# Patient Record
Sex: FEMALE | Race: OTHER | HISPANIC OR LATINO | Employment: FULL TIME | URBAN - METROPOLITAN AREA
[De-identification: names, ages, dates, MRNs, and addresses within clinical notes are randomized per-mention and may not be internally consistent; named-entity substitution may affect disease eponyms.]

---

## 2022-05-03 ENCOUNTER — OFFICE VISIT (OUTPATIENT)
Dept: URGENT CARE | Facility: CLINIC | Age: 27
End: 2022-05-03
Payer: COMMERCIAL

## 2022-05-03 VITALS
RESPIRATION RATE: 16 BRPM | HEIGHT: 60 IN | WEIGHT: 168 LBS | BODY MASS INDEX: 32.98 KG/M2 | DIASTOLIC BLOOD PRESSURE: 79 MMHG | HEART RATE: 94 BPM | OXYGEN SATURATION: 98 % | SYSTOLIC BLOOD PRESSURE: 117 MMHG | TEMPERATURE: 99 F

## 2022-05-03 DIAGNOSIS — S00.83XA FACIAL CONTUSION, INITIAL ENCOUNTER: Primary | ICD-10-CM

## 2022-05-03 PROCEDURE — 70150 X-RAY EXAM OF FACIAL BONES: CPT | Mod: S$GLB,,, | Performed by: RADIOLOGY

## 2022-05-03 PROCEDURE — 99203 OFFICE O/P NEW LOW 30 MIN: CPT | Mod: S$GLB,,, | Performed by: NURSE PRACTITIONER

## 2022-05-03 PROCEDURE — 99203 PR OFFICE/OUTPT VISIT, NEW, LEVL III, 30-44 MIN: ICD-10-PCS | Mod: S$GLB,,, | Performed by: NURSE PRACTITIONER

## 2022-05-03 PROCEDURE — 70150 XR FACIAL BONES 3 OR MORE VIEW: ICD-10-PCS | Mod: S$GLB,,, | Performed by: RADIOLOGY

## 2022-05-03 RX ORDER — TRETINOIN 0.5 MG/G
CREAM TOPICAL NIGHTLY
COMMUNITY
Start: 2022-04-27

## 2022-05-03 RX ORDER — DOXYCYCLINE 50 MG/1
50 CAPSULE ORAL 2 TIMES DAILY
COMMUNITY
Start: 2022-04-29 | End: 2023-06-06

## 2022-05-03 RX ORDER — PHENTERMINE HYDROCHLORIDE 37.5 MG/1
18.75 TABLET ORAL 2 TIMES DAILY
COMMUNITY
Start: 2022-04-15 | End: 2023-06-06

## 2022-05-03 NOTE — PROGRESS NOTES
Subjective:       Patient ID: Velvet Kramer is a 27 y.o. female.    Chief Complaint: Eye Problem    Patient stated the incident happened about 3 hours ago.  The patient was sitting down and she went to grab his iPad for him and she bent down and hit her right eye on the rail.  She stated it was red and she feels dizzy.  She has a headache but her eye does not itch.   Patient's place of employment - [employer]  Patient's job title - Care giver  Date of injury - 05/03/2022  Body part injured including left or right - right eye   Injury Mechanism -   What they were doing when they got hurt - bending down trying to get the patients iPad and she did not see the rail going upstairs.  What they did immediately after - called the office because her eye was getting red and it hurts really bad.    27-year-old female presents to clinic for evaluation of injury to right eye, sustained while at work today approximately 3 hours prior to arrival to Urgent Care.  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  She states initially she felt dizzy.  She has applied ice to the affected area.  She reports redness and swelling to right upper cheek.  She has not taken any medications for her current symptoms.  She denies drainage from eye.  She denies LOC.  She reports visual concerns due to headache.  She is awake and alert, answers questions appropriately, no acute distress noted on today's visit.    Eye Problem   The right eye is affected. This is a new problem. The current episode started today. The problem occurs constantly. The problem has been unchanged. The injury mechanism is unknown. The pain is at a severity of 8/10. The pain is severe. There is no known exposure to pink eye. She does not wear contacts. Associated symptoms include a foreign body sensation. Pertinent negatives include no  blurred vision, eye discharge, eye redness, fever, itching, nausea or vomiting. Associated symptoms comments: Headache, dizzy. Treatments tried: ice. The treatment provided mild relief.       Constitution: Negative for activity change, appetite change, chills, sweating, fatigue and fever.   HENT: Negative for congestion, nosebleeds and sinus pain.    Cardiovascular: Negative for chest pain.   Eyes: Positive for eye pain. Negative for foreign body in eye, eye discharge, eye itching, eye redness and blurred vision.   Respiratory: Negative for shortness of breath.    Gastrointestinal: Negative for nausea and vomiting.   Musculoskeletal: Positive for pain and trauma.   Skin: Positive for erythema and bruising.   Neurological: Positive for dizziness and headaches. Negative for light-headedness, passing out, coordination disturbances, loss of balance, disorientation, altered mental status, loss of consciousness, numbness and tingling.   Psychiatric/Behavioral: Negative for altered mental status and disorientation.        Objective:      Physical Exam  Vitals and nursing note reviewed.   Constitutional:       General: She is not in acute distress.     Appearance: Normal appearance. She is not ill-appearing, toxic-appearing or diaphoretic.   HENT:      Head: Normocephalic and atraumatic.        Right Ear: External ear normal.      Left Ear: External ear normal.      Nose: Nose normal.      Mouth/Throat:      Mouth: Mucous membranes are moist.   Eyes:      General:         Right eye: No discharge.         Left eye: No discharge.      Extraocular Movements: Extraocular movements intact.      Right eye: Normal extraocular motion and no nystagmus.      Left eye: Normal extraocular motion and no nystagmus.      Conjunctiva/sclera: Conjunctivae normal.      Right eye: Right conjunctiva is not injected. No exudate or hemorrhage.     Left eye: Left conjunctiva is not injected. No exudate or hemorrhage.     Pupils: Pupils are equal,  round, and reactive to light.   Cardiovascular:      Rate and Rhythm: Normal rate.      Pulses: Normal pulses.   Pulmonary:      Effort: Pulmonary effort is normal. No respiratory distress.   Musculoskeletal:         General: Swelling and tenderness present. No deformity or signs of injury.      Cervical back: Normal range of motion.   Skin:     Coloration: Skin is not jaundiced or pale.      Findings: Bruising and erythema present.   Neurological:      General: No focal deficit present.      Mental Status: She is alert and oriented to person, place, and time.      Motor: No weakness.   Psychiatric:         Mood and Affect: Mood normal.         Behavior: Behavior normal.         Thought Content: Thought content normal.         Judgment: Judgment normal.         XR FACIAL BONES 3 OR MORE VIEW    Result Date: 5/3/2022  EXAMINATION: XR FACIAL BONES 3 OR MORE VIEW CLINICAL HISTORY: Unspecified injury of right eye and orbit, initial encounter TECHNIQUE: Four views of the facial bones were performed. COMPARISON: None FINDINGS: There is no evidence of acute fracture or dislocation of the maxillofacial structures.  The bilateral nasal bones appear intact.  The nasal septum appears midline.  The bilateral orbits appear intact.  The mandible is intact and not dislocated.  No large depressed calvarial fracture.     No acute maxillofacial fracture or dislocation. Electronically signed by: Ant Soto Date:    05/03/2022 Time:    18:45      Assessment:       1. Facial contusion, initial encounter        Plan:       Discussed x-rays, no acute fracture of the orbit.  Suspect contusion.  Discuss for stayed measures, with ice, and alternating Tylenol/Motrin for pain relief.  Referral placed to occupational health, discussed walk-in at either [facility] or [facility] tomorrow morning.  Patient verbalized understanding and is in agreement with plan.      Patient Instructions   - You must understand that you have received an Urgent  Care treatment only and that you may be released before all of your medical problems are known or treated.   - You, the patient, will arrange for follow up care as instructed.   - If your condition worsens or fails to improve we recommend that you receive another evaluation at the ER immediately or contact your PCP to discuss your concerns or return here.                     No follow-ups on file.

## 2022-05-03 NOTE — LETTER
[facility] - [facility]  [address], [place] [address]  Phone: [phone]  Fax: [phone]  [facility]: [id number]    Pt Name: Velvet Kramer  Injury Date: 05/03/2022   Employee ID:  Date of First Treatment: 05/03/2022   Company: [employer]       Appointment Time: 04:35 PM Arrived:1707   Provider: Buzz Zepeda NP Time Out:4750     Office Treatment:   1. Facial contusion, initial encounter                     Return Appointment: 5/4/2022 / [facility], walk in @ either [facility] or [place] location.

## 2022-05-04 ENCOUNTER — OFFICE VISIT (OUTPATIENT)
Dept: URGENT CARE | Facility: CLINIC | Age: 27
End: 2022-05-04
Payer: COMMERCIAL

## 2022-05-04 VITALS
BODY MASS INDEX: 32.98 KG/M2 | DIASTOLIC BLOOD PRESSURE: 69 MMHG | TEMPERATURE: 99 F | HEIGHT: 60 IN | HEART RATE: 100 BPM | OXYGEN SATURATION: 98 % | SYSTOLIC BLOOD PRESSURE: 109 MMHG | WEIGHT: 168 LBS

## 2022-05-04 DIAGNOSIS — S00.83XA FACIAL CONTUSION, INITIAL ENCOUNTER: ICD-10-CM

## 2022-05-04 DIAGNOSIS — Z02.6 ENCOUNTER RELATED TO WORKER'S COMPENSATION CLAIM: Primary | ICD-10-CM

## 2022-05-04 DIAGNOSIS — S05.91XA RIGHT ORBIT TRAUMA, INITIAL ENCOUNTER: ICD-10-CM

## 2022-05-04 DIAGNOSIS — G44.319 ACUTE POST-TRAUMATIC HEADACHE, NOT INTRACTABLE: ICD-10-CM

## 2022-05-04 PROCEDURE — 99214 OFFICE O/P EST MOD 30 MIN: CPT | Mod: S$GLB,,, | Performed by: PREVENTIVE MEDICINE

## 2022-05-04 PROCEDURE — 99214 PR OFFICE/OUTPT VISIT, EST, LEVL IV, 30-39 MIN: ICD-10-PCS | Mod: S$GLB,,, | Performed by: PREVENTIVE MEDICINE

## 2022-05-04 RX ORDER — IBUPROFEN 600 MG/1
600 TABLET ORAL EVERY 6 HOURS PRN
Qty: 40 TABLET | Refills: 0 | Status: SHIPPED | OUTPATIENT
Start: 2022-05-04 | End: 2023-06-06

## 2022-05-04 NOTE — LETTER
[facility]  [address] [address]  Phone: [phone]  Fax: [phone]  [facility]: [id number]    Pt Name: Velvet Kramer  Injury Date: 05/03/2022   Employee ID:  Date of First Treatment: 05/04/2022   Company: [employer]      Appointment Time: 08:15 AM Arrived: 8:15 AM   Provider: Michael Azevedo MD Time Out: 10:20 AM     Office Treatment:   1. Encounter related to worker's compensation claim    2. Facial contusion, initial encounter    3. Right orbit trauma, initial encounter    4. Acute post-traumatic headache, not intractable      Medications Ordered This Encounter   Medications    ibuprofen (ADVIL,MOTRIN) 600 MG tablet      Patient Instructions: Apply ice 24-48 hours then apply heat/warm soaks      Restrictions: Disabled until next office visit     Return Appointment: 5/9/2022 at 2:15 AM GRETCHEN

## 2022-05-04 NOTE — PROGRESS NOTES
Subjective:       Patient ID: Velvet Kramer is a 27 y.o. female.    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 She is unsure if she hit the inside of the eye ball but knows she hit the outer part (socket). No bruising or bleeding was present. She felt light headed and sat down immediately after the incident and complains about having a headache now. She states there is some tingling as well as achiness.  A vision test was administered - she was 20/70 in both eyes, 20/70 individually. Current pain score 8/10. LRC        Constitution: Positive for activity change.   HENT: Negative.    Neck: neck negative.   Cardiovascular: Negative.    Eyes: Positive for eye trauma, eye pain, double vision and blurred vision.   Respiratory: Negative.    Gastrointestinal: Negative.    Musculoskeletal: Positive for pain and muscle ache.   Skin: Negative for color change, erythema and bruising.   Neurological: Positive for dizziness, light-headedness and tingling.        Objective:      Physical Exam  Vitals and nursing note reviewed.   Constitutional:       Appearance: Normal appearance. She is well-developed and normal weight.   HENT:      Head: Normocephalic. Contusion present. No Aragon's sign, abrasion, masses, right periorbital erythema or laceration.      Jaw: There is normal jaw occlusion.      Salivary Glands: Right salivary gland is not diffusely enlarged. Left salivary gland is not diffusely enlarged.        Comments: Patient has tenderness with palpation about the inferior aspect of her right orbit with minimal swelling noted.  minimal ecchymosis is noted at this time.  Patient's funduscopic examination is unremarkable without evidence of corneal abrasion, hyphema, or other trauma to the eye.     Right Ear: Tympanic membrane,  ear canal and external ear normal.      Left Ear: Tympanic membrane, ear canal and external ear normal.      Nose: Nose normal.      Mouth/Throat:      Mouth: Mucous membranes are moist.   Eyes:      General:         Right eye: No discharge.         Left eye: No discharge.      Extraocular Movements: Extraocular movements intact.      Conjunctiva/sclera: Conjunctivae normal.      Pupils: Pupils are equal, round, and reactive to light.   Cardiovascular:      Rate and Rhythm: Normal rate.   Pulmonary:      Effort: Pulmonary effort is normal.   Musculoskeletal:      Cervical back: Normal range of motion.      Lumbar back: No swelling, edema, deformity, lacerations, spasms or bony tenderness.   Skin:     General: Skin is warm.      Findings: No erythema.   Neurological:      Mental Status: She is alert.      Comments: No focal neurologic deficits         Assessment:       1. Encounter related to worker's compensation claim    2. Facial contusion, initial encounter    3. Right orbit trauma, initial encounter    4. Acute post-traumatic headache, not intractable        Plan:         Medications Ordered This Encounter   Medications    ibuprofen (ADVIL,MOTRIN) 600 MG tablet     Sig: Take 1 tablet (600 mg total) by mouth every 6 (six) hours as needed for Pain (Take medication with food).     Dispense:  40 tablet     Refill:  0     Patient Instructions: Apply ice 24-48 hours then apply heat/warm soaks   Restrictions: Disabled until next office visit  Follow up in about 5 days (around 5/9/2022).

## 2022-05-09 ENCOUNTER — OFFICE VISIT (OUTPATIENT)
Dept: URGENT CARE | Facility: CLINIC | Age: 27
End: 2022-05-09
Payer: COMMERCIAL

## 2022-05-09 DIAGNOSIS — S00.83XD FACIAL CONTUSION, SUBSEQUENT ENCOUNTER: Primary | ICD-10-CM

## 2022-05-09 DIAGNOSIS — S05.91XD RIGHT ORBIT TRAUMA, SUBSEQUENT ENCOUNTER: ICD-10-CM

## 2022-05-09 DIAGNOSIS — G44.319 ACUTE POST-TRAUMATIC HEADACHE, NOT INTRACTABLE: ICD-10-CM

## 2022-05-09 PROCEDURE — 99214 PR OFFICE/OUTPT VISIT, EST, LEVL IV, 30-39 MIN: ICD-10-PCS | Mod: S$GLB,,, | Performed by: EMERGENCY MEDICINE

## 2022-05-09 PROCEDURE — 99214 OFFICE O/P EST MOD 30 MIN: CPT | Mod: S$GLB,,, | Performed by: EMERGENCY MEDICINE

## 2022-05-09 RX ORDER — BUTALBITAL, ACETAMINOPHEN AND CAFFEINE 50; 325; 40 MG/1; MG/1; MG/1
1 TABLET ORAL EVERY 6 HOURS PRN
Qty: 28 TABLET | Refills: 0 | Status: SHIPPED | OUTPATIENT
Start: 2022-05-09 | End: 2022-05-16

## 2022-05-09 NOTE — LETTER
[facility]  [address] [address]  Phone: [phone]  Fax: [phone]  [facility]: [id number]    Pt Name: Velvet Kramer  Injury Date: 05/03/2022   Employee ID:  Date of First Treatment: 05/09/2022   Company: [employer]      Appointment Time: 02:15 PM Arrived: 2:44 PM   Provider: Marcos Henson MD Time Out: 4:15 PM     Office Treatment:   1. Facial contusion, subsequent encounter    2. Right orbit trauma, subsequent encounter    3. Acute post-traumatic headache, not intractable      Medications Ordered This Encounter   Medications    butalbital-acetaminophen-caffeine -40 mg (FIORICET, ESGIC) -40 mg per tablet      Patient Instructions: Attention not to aggravate affected area (ICE PACKS TO THE RIGHT FACE/EYE REGION 4 TIMES PER DAY FOR 15-20 MINUTES)      Restrictions: Disabled until next office visit     Return Appointment: 5/16/2022 at 11:15 AM GRETCHEN

## 2022-05-09 NOTE — PROGRESS NOTES
Subjective:       Patient ID: Velvet Kramer is a 27 y.o. female.    Chief Complaint: Eye Pain (RT)    WC Follow-up of RT Eye Injury ( DOI 05-03-22 ) Pain score 7/10 with complaints of Headaches, RT Eye twitching, Spasms, Dizziness. Taking OTC IBP 200mg. 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  Since then she has reported headaches, swelling to the right upper cheek in lower eye.  She also noticed the pigment discoloration to the 4 o'clock position around the iris that she and her boyfriend had not noticed before.  She denies diplopia denies tearing denies pain of the eye itself and has normal extraocular movements.  They are requesting a full ophthalmological evaluation.  Fluorescein exam today shows no fluorescein uptake, no corneal abrasion, no corneal laceration, normal extraocular movements, visual acuity 20 70 in both eyes from last visit and is unchanged.  I reviewed the x-ray showing no acute fracture of the orbit.  Patient reports not being able to work due to facial pain and swelling as well as intermittent headaches and some light sensitivity to the right eye.  Will prescribe Fioricet, refer to ophthalmology for full dilated eye exam including pressures and will have her return in 1 week.  Kept disabled for 1 week.  Courage to take ibuprofen and prescribed Fioricet for facial pain and headache and to wear sunglasses for comfort and to return here in 1 week.    Eye Pain   Pertinent negatives include no double vision or eye redness.     ROS    Eyes: Positive for eye pain. Negative for eye trauma, foreign body in eye, eye redness, double vision and eyelid swelling.   Musculoskeletal: Positive for pain.   Skin: Negative for wound, erythema and bruising.   Neurological: Positive for dizziness and headaches.         Objective:      Physical Exam  Vitals and nursing note reviewed.   Constitutional:       Appearance: She is well-developed.   HENT:      Head: Normocephalic and atraumatic.      Right Ear: External ear normal.      Left Ear: External ear normal.      Nose: Nose normal.   Eyes:      General: Lids are normal.         Right eye: No discharge.         Left eye: No discharge.      Extraocular Movements: Extraocular movements intact.      Conjunctiva/sclera: Conjunctivae normal.      Pupils: Pupils are equal, round, and reactive to light.      Comments: THERE IS SOFT TISSUE SWELLING OF THE RIGHT INFRAORBITAL REGION WITH TENDERNESS TO PALPATION WHICH IS DESCRIBED AS MILD.  EXTRAOCULAR MOVEMENTS ARE INTACT, FLUORESCEIN EXAM SHOWS NO FLUORESCEIN UPTAKE NO HYPHEMA AND CONJUNCTIVA SHOWS NO CONJUNCTIVAL HEMORRHAGE.  THERE IS A 2 MM PIGMENT DISCOLORATION AT THE 4 O'CLOCK POSITION WHICH APPEARS CHRONIC.   Neck:      Trachea: Trachea and phonation normal.   Musculoskeletal:         General: Normal range of motion.      Cervical back: Full passive range of motion without pain and neck supple.   Skin:     General: Skin is warm and dry.      Findings: No erythema.   Neurological:      Mental Status: She is alert and oriented to person, place, and time.   Psychiatric:         Speech: Speech normal.         Behavior: Behavior normal.         Thought Content: Thought content normal.         Judgment: Judgment normal.         Assessment:       1. Facial contusion, subsequent encounter    2. Right orbit trauma, subsequent encounter    3. 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  Since then she has reported headaches, swelling to the right upper cheek in lower eye.  She also noticed the pigment  discoloration to the 4 o'clock position around the iris that she and her boyfriend had not noticed before.  She denies diplopia denies tearing denies pain of the eye itself and has normal extraocular movements.  They are requesting a full ophthalmological evaluation.  Fluorescein exam today shows no fluorescein uptake, no corneal abrasion, no corneal laceration, normal extraocular movements, visual acuity 20 70 in both eyes from last visit and is unchanged.  I reviewed the x-ray showing no acute fracture of the orbit.  Patient reports not being able to work due to facial pain and swelling as well as intermittent headaches and some light sensitivity to the right eye.  Will prescribe Fioricet, refer to ophthalmology for full dilated eye exam including pressures and will have her return in 1 week.  Kept disabled for 1 week.  Courage to take ibuprofen and prescribed Fioricet for facial pain and headache and to wear sunglasses for comfort and to return here in 1 week.    Medications Ordered This Encounter   Medications    butalbital-acetaminophen-caffeine -40 mg (FIORICET, ESGIC) -40 mg per tablet     Sig: Take 1 tablet by mouth every 6 (six) hours as needed for Pain or Headaches.     Dispense:  28 tablet     Refill:  0     Patient Instructions: Attention not to aggravate affected area (ICE PACKS TO THE RIGHT FACE/EYE REGION 4 TIMES PER DAY FOR 15-20 MINUTES)   Restrictions: Disabled until next office visit  Follow up in about 1 week (around 5/16/2022).

## 2022-05-16 ENCOUNTER — OFFICE VISIT (OUTPATIENT)
Dept: URGENT CARE | Facility: CLINIC | Age: 27
End: 2022-05-16
Payer: COMMERCIAL

## 2022-05-16 DIAGNOSIS — S00.83XD FACIAL CONTUSION, SUBSEQUENT ENCOUNTER: Primary | ICD-10-CM

## 2022-05-16 DIAGNOSIS — S05.91XD RIGHT ORBIT TRAUMA, SUBSEQUENT ENCOUNTER: ICD-10-CM

## 2022-05-16 PROCEDURE — 99214 OFFICE O/P EST MOD 30 MIN: CPT | Mod: S$GLB,,, | Performed by: EMERGENCY MEDICINE

## 2022-05-16 PROCEDURE — 99214 PR OFFICE/OUTPT VISIT, EST, LEVL IV, 30-39 MIN: ICD-10-PCS | Mod: S$GLB,,, | Performed by: EMERGENCY MEDICINE

## 2022-05-16 NOTE — PROGRESS NOTES
Subjective:       Patient ID: Velvet Kramer is a 27 y.o. female.    Chief Complaint: Eye Problem    WC Follow-up of RT Eye Injury ( DOI 05-03-22 )  Taking OTC IBP 200mg. Feeling much better. Does have some watering. mcj    PATIENT REPORTS RESOLUTION OF ANY SORT OF BLURRED VISION OR SIGNIFICANT PAIN OR HEADACHES.  SHE REPORTS OCCASIONAL MINIMAL TEARING.  SHE WEARS SUNGLASSES FOR COMFORT AT TIMES.  SHE FEELS READY TO GO BACK TO WORK AND HER MAJOR CONCERN IS THE PIGMENT DISCOLORATION AT THE 4:00 A.M. AREA WHICH IS NOT AN ABRASION NOT OF FOREIGN BODY NO CONJUNCTIVAL HEMORRHAGE AND DOES APPEAR TO BE CHRONIC IN NATURE.  SHE HAS NEVER SEEN IT BEFORE AND IS LOOKING FOR TO OPHTHALMOLOGY APPOINTMENT WHICH IS SCHEDULED TOMORROW AT 10:30 A.M..  SHE WILL BE ALLOWED TO WORK REGULAR DUTY ON WEDNESDAY AND WILL RETURN IN 1 WEEK AFTER TRIAL OF REGULAR DUTY AND TO FOLLOW-UP ON OPHTHALMOLOGY FINDINGS IF ANY AND RECOMMENDATIONS.  RETURN TO CLINIC 05/23/2022        Eye Problem   Pertinent negatives include no double vision or eye redness.   Eye Pain   Pertinent negatives include no double vision or eye redness.     ROS    Eyes: Positive for eye pain (RESOLVING). Negative for eye trauma, foreign body in eye, eye redness, double vision and eyelid swelling.   Musculoskeletal: Negative for pain.   Skin: Negative for wound, erythema and bruising.   Neurological: Negative for dizziness and headaches.        Objective:      Physical Exam  Vitals and nursing note reviewed.   Constitutional:       Appearance: She is well-developed.   HENT:      Head: Normocephalic and atraumatic.      Right Ear: External ear normal.      Left Ear: External ear normal.      Nose: Nose normal.   Eyes:      General: Lids are normal.         Right eye: No discharge.         Left eye: No discharge.      Extraocular Movements: Extraocular movements intact.      Conjunctiva/sclera: Conjunctivae normal.      Pupils: Pupils are equal, round, and reactive to light.       Comments: THERE IS RESOLVED SOFT TISSUE SWELLING OF THE RIGHT INFRAORBITAL REGION WITH TENDERNESS TO PALPATION WHICH IS DESCRIBED AS NEARLY RESOLVED.  EXTRAOCULAR MOVEMENTS ARE INTACT, FLUORESCEIN EXAM SHOWED NO FLUORESCEIN UPTAKE NO HYPHEMA AND CONJUNCTIVA SHOWS NO CONJUNCTIVAL HEMORRHAGE.  THERE IS A 2 MM PIGMENT DISCOLORATION AT THE 4 O'CLOCK POSITION WHICH APPEARS CHRONIC.   Neck:      Trachea: Trachea and phonation normal.   Musculoskeletal:         General: Normal range of motion.      Cervical back: Full passive range of motion without pain and neck supple.   Skin:     General: Skin is warm and dry.      Findings: No erythema.   Neurological:      Mental Status: She is alert and oriented to person, place, and time.   Psychiatric:         Speech: Speech normal.         Behavior: Behavior normal.         Thought Content: Thought content normal.         Judgment: Judgment normal.         Assessment:       1. Facial contusion, subsequent encounter    2. Right orbit trauma, subsequent encounter        Plan:       PATIENT REPORTS RESOLUTION OF ANY SORT OF BLURRED VISION OR SIGNIFICANT PAIN OR HEADACHES.  SHE REPORTS OCCASIONAL MINIMAL TEARING.  SHE WEARS SUNGLASSES FOR COMFORT AT TIMES.  SHE FEELS READY TO GO BACK TO WORK AND HER MAJOR CONCERN IS THE PIGMENT DISCOLORATION AT THE 4:00 A.M. AREA WHICH IS NOT AN ABRASION NOT OF FOREIGN BODY NO CONJUNCTIVAL HEMORRHAGE AND DOES APPEAR TO BE CHRONIC IN NATURE.  SHE HAS NEVER SEEN IT BEFORE AND IS LOOKING FOR TO OPHTHALMOLOGY APPOINTMENT WHICH IS SCHEDULED TOMORROW AT 10:30 A.M..  SHE WILL BE ALLOWED TO WORK REGULAR DUTY ON WEDNESDAY AND WILL RETURN IN 1 WEEK AFTER TRIAL OF REGULAR DUTY AND TO FOLLOW-UP ON OPHTHALMOLOGY FINDINGS IF ANY AND RECOMMENDATIONS.  RETURN TO CLINIC 05/23/2022       Patient Instructions: Attention not to aggravate affected area   Restrictions: Regular Duty (START REGULAR DUTY WEDNESDAY 5/18/2022)  Follow up in about 1 week (around 5/23/2022)  for KEEP EYE DOCTOR APPOINTMENT FOR TOMORROW AT 10:30 AS PLANNED.

## 2022-05-16 NOTE — LETTER
[facility]  [address] [address]  Phone: [phone]  Fax: [phone]  [facility]: [id number]    Pt Name: Velvet Kramer  Injury Date: 05/03/2022   Employee ID:  Date of First Treatment: 05/16/2022   Company: [employer]      Appointment Time: 11:15 AM Arrived: 10:47 AM   Provider: Marcos Hneson MD Time Out: 12:25 PM     Office Treatment:   1. Facial contusion, subsequent encounter    2. Right orbit trauma, subsequent encounter          Patient Instructions: Attention not to aggravate affected area      Restrictions: Regular Duty (START REGULAR DUTY WEDNESDAY 5/18/2022)     Return Appointment: 5/23/2022 at 10:45 AM GRETCHEN

## 2022-05-17 ENCOUNTER — OFFICE VISIT (OUTPATIENT)
Dept: OPHTHALMOLOGY | Facility: CLINIC | Age: 27
End: 2022-05-17
Payer: COMMERCIAL

## 2022-05-17 DIAGNOSIS — S05.8X1A BLUNT TRAUMA OF RIGHT EYE, INITIAL ENCOUNTER: Primary | ICD-10-CM

## 2022-05-17 DIAGNOSIS — H00.12 CHALAZION OF RIGHT LOWER EYELID: ICD-10-CM

## 2022-05-17 PROCEDURE — 99999 PR PBB SHADOW E&M-EST. PATIENT-LVL III: CPT | Mod: PBBFAC,,, | Performed by: OPHTHALMOLOGY

## 2022-05-17 PROCEDURE — 99202 OFFICE O/P NEW SF 15 MIN: CPT | Mod: S$GLB,,, | Performed by: OPHTHALMOLOGY

## 2022-05-17 PROCEDURE — 99999 PR PBB SHADOW E&M-EST. PATIENT-LVL III: ICD-10-PCS | Mod: PBBFAC,,, | Performed by: OPHTHALMOLOGY

## 2022-05-17 PROCEDURE — 99202 PR OFFICE/OUTPT VISIT, NEW, LEVL II, 15-29 MIN: ICD-10-PCS | Mod: S$GLB,,, | Performed by: OPHTHALMOLOGY

## 2022-05-17 NOTE — PROGRESS NOTES
HPI     Referred--Workers Comp  Patient states hit OD on the stairway rail 05/03/2022).  Pt states OD still little bruised and tender to touch.  Vision seem stable, but still light sensitivity.    I have personally interviewed the patient, reviewed the history and   examined the patient and agree with the technician's &/or resident's exam,   assessment and plan.       Last edited by Nish Workman MD on 5/17/2022 10:34 AM. (History)            Assessment /Plan     For exam results, see Encounter Report.    Blunt trauma of right eye, initial encounter    Chalazion of right lower eyelid      Warm compresses for chalazion. Artificial tears. Return as needed.

## 2022-05-23 ENCOUNTER — OFFICE VISIT (OUTPATIENT)
Dept: URGENT CARE | Facility: CLINIC | Age: 27
End: 2022-05-23
Payer: COMMERCIAL

## 2022-05-23 DIAGNOSIS — S05.91XD RIGHT ORBIT TRAUMA, SUBSEQUENT ENCOUNTER: ICD-10-CM

## 2022-05-23 DIAGNOSIS — S00.83XD FACIAL CONTUSION, SUBSEQUENT ENCOUNTER: Primary | ICD-10-CM

## 2022-05-23 PROCEDURE — 99214 OFFICE O/P EST MOD 30 MIN: CPT | Mod: S$GLB,,, | Performed by: EMERGENCY MEDICINE

## 2022-05-23 PROCEDURE — 99214 PR OFFICE/OUTPT VISIT, EST, LEVL IV, 30-39 MIN: ICD-10-PCS | Mod: S$GLB,,, | Performed by: EMERGENCY MEDICINE
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
## 2022-08-07 ENCOUNTER — OFFICE VISIT (OUTPATIENT)
Dept: URGENT CARE | Facility: CLINIC | Age: 27
End: 2022-08-07

## 2022-08-07 VITALS
WEIGHT: 168 LBS | SYSTOLIC BLOOD PRESSURE: 125 MMHG | TEMPERATURE: 99 F | BODY MASS INDEX: 32.98 KG/M2 | OXYGEN SATURATION: 97 % | DIASTOLIC BLOOD PRESSURE: 80 MMHG | HEART RATE: 110 BPM | HEIGHT: 60 IN | RESPIRATION RATE: 16 BRPM

## 2022-08-07 DIAGNOSIS — Z76.89 RETURN TO WORK EVALUATION: Primary | ICD-10-CM

## 2022-08-07 DIAGNOSIS — M54.2 NECK PAIN: ICD-10-CM

## 2022-08-07 PROCEDURE — 99213 PR OFFICE/OUTPT VISIT, EST, LEVL III, 20-29 MIN: ICD-10-PCS | Mod: S$GLB,,,

## 2022-08-07 PROCEDURE — 99213 OFFICE O/P EST LOW 20 MIN: CPT | Mod: S$GLB,,,

## 2022-08-07 RX ORDER — DICLOFENAC SODIUM 10 MG/G
2 GEL TOPICAL 4 TIMES DAILY
Qty: 50 G | Refills: 0 | Status: SHIPPED | OUTPATIENT
Start: 2022-08-07 | End: 2022-08-17
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
## 2022-08-07 NOTE — LETTER
August 7, 2022      [facility]  [address], [address]  [place] [address]  Phone: [phone]  Fax: [phone]       Patient: Velvet Kramer   YOB: 1995  Date of Visit: 08/07/2022    To Whom It May Concern:    Mabel Kramer  was at [facility] on 08/07/2022. The patient may return to work on 8/8/2022 with light duty restrictions as to not aggravate the affected area. If you have any questions or concerns, or if I can be of further assistance, please do not hesitate to contact me.    Sincerely,    Natalie Hawley PA-C

## 2022-08-08 ENCOUNTER — TELEPHONE (OUTPATIENT)
Dept: URGENT CARE | Facility: CLINIC | Age: 27
End: 2022-08-08
Payer: COMMERCIAL

## 2022-08-08 NOTE — LETTER
August 8, 2022      [facility]  [address], [address]  [place] [address]  Phone: [phone]  Fax: [phone]       Patient: Velvet Kramer   YOB: 1995  Date of Visit: 08/07/2022    To Whom It May Concern:    Mabel Kramer  was at [facility] on 08/07/2022. The patient may return to work/school on 8/8/2022 w/ no restrictions. If you have any questions or concerns, or if I can be of further assistance, please do not hesitate to contact me.    Sincerely,    Buzz Zepeda NP
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
## 2022-09-24 ENCOUNTER — OFFICE VISIT (OUTPATIENT)
Dept: URGENT CARE | Facility: CLINIC | Age: 27
End: 2022-09-24

## 2022-09-24 VITALS
WEIGHT: 160 LBS | OXYGEN SATURATION: 98 % | HEART RATE: 83 BPM | SYSTOLIC BLOOD PRESSURE: 104 MMHG | TEMPERATURE: 98 F | DIASTOLIC BLOOD PRESSURE: 73 MMHG | BODY MASS INDEX: 31.25 KG/M2 | RESPIRATION RATE: 19 BRPM

## 2022-09-24 DIAGNOSIS — R07.89 OTHER CHEST PAIN: Primary | ICD-10-CM

## 2022-09-24 DIAGNOSIS — Z76.89 ENCOUNTER TO ESTABLISH CARE: ICD-10-CM

## 2022-09-24 PROCEDURE — 93010 EKG 12-LEAD: ICD-10-PCS | Mod: S$GLB,,, | Performed by: INTERNAL MEDICINE

## 2022-09-24 PROCEDURE — 93005 ELECTROCARDIOGRAM TRACING: CPT | Mod: S$GLB,,, | Performed by: FAMILY MEDICINE

## 2022-09-24 PROCEDURE — 93010 ELECTROCARDIOGRAM REPORT: CPT | Mod: S$GLB,,, | Performed by: INTERNAL MEDICINE

## 2022-09-24 PROCEDURE — 99203 PR OFFICE/OUTPT VISIT, NEW, LEVL III, 30-44 MIN: ICD-10-PCS | Mod: TIER,S$GLB,, | Performed by: FAMILY MEDICINE

## 2022-09-24 PROCEDURE — 93005 EKG 12-LEAD: ICD-10-PCS | Mod: S$GLB,,, | Performed by: FAMILY MEDICINE

## 2022-09-24 PROCEDURE — 99203 OFFICE O/P NEW LOW 30 MIN: CPT | Mod: TIER,S$GLB,, | Performed by: FAMILY MEDICINE

## 2022-09-24 RX ORDER — CYCLOBENZAPRINE HCL 10 MG
10 TABLET ORAL 3 TIMES DAILY PRN
Qty: 30 TABLET | Refills: 0 | Status: SHIPPED | OUTPATIENT
Start: 2022-09-24 | End: 2022-10-04

## 2022-09-24 RX ORDER — IBUPROFEN 600 MG/1
600 TABLET ORAL EVERY 8 HOURS PRN
Qty: 30 TABLET | Refills: 0 | Status: SHIPPED | OUTPATIENT
Start: 2022-09-24 | End: 2022-10-04
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
## 2022-09-24 NOTE — PATIENT INSTRUCTIONS
General Discharge Instructions   PLEASE READ YOUR DISCHARGE INSTRUCTIONS ENTIRELY AS IT CONTAINS IMPORTANT INFORMATION.  If you were prescribed a narcotic or controlled medication, do not drive or operate heavy equipment or machinery while taking these medications.  If you were prescribed antibiotics, please take them to completion.  You must understand that you've received an Urgent Care treatment only and that you may be released before all your medical problems are known or treated. You, the patient, will arrange for follow up care as instructed.    OVER THE COUNTER RECOMMENDATIONS/SUGGESTIONS.    Make sure to stay well hydrated.    Use Nasal Saline to mechanically move any post nasal drip from your eustachian tube or from the back of your throat.    Use warm salt water gargles to ease your throat pain. Warm salt water gargles as needed for sore throat- 1/2 tsp salt to 1 cup warm water, gargle as desired.    Use an antihistamine such as Claritin, Zyrtec or Allegra to dry you out.    Use pseudoephedrine (behind the counter) to decongest. Pseudoephedrine 30 mg up to 240 mg /day. It can raise your blood pressure and give you palpitations.    Use mucinex (guaifenesin) to break up mucous up to 2400mg/day to loosen any mucous.    The mucinex DM pill has a cough suppressant that can be sedating. It can be used at night to stop the tickle at the back of your throat.    You can use Mucinex D (it has guaifenesin and a high dose of pseudoephedrine) in the mornings to help decongest.    Use Afrin in each nare for no longer than 3 days, as it is addictive. It can also dry out your mucous membranes and cause elevated blood pressure. This is especially useful if you are flying.    Use Flonase 1-2 sprays/nostril per day. It is a local acting steroid nasal spray, if you develop a bloody nose, stop using the medication immediately.    Sometimes Nyquil at night is beneficial to help you get some rest, however it is sedating and it  does have an antihistamine, and tylenol.    Honey is a natural cough suppressant that can be used.    Tylenol up to 4,000 mg a day is safe for short periods and can be used for body aches, pain, and fever. However in high doses and prolonged use it can cause liver irritation.    Ibuprofen is a non-steroidal anti-inflammatory that can be used for body aches, pain, and fever.However it can also cause stomach irritation if over used.     Follow up with your PCP or specialty clinic as instructed in the next 2-3 days if not improved or as needed. You can call [phone] to schedule an appointment with appropriate provider.      If you condition worsens, we recommend that you receive another evaluation at the emergency room immediately or contact your primary medical clinic's after hours call service to discuss your concerns.      Please return here or go to the Emergency Department for any concerns or worsening condition.   You can also call [phone] to schedule an appointment with the appropriate provider.    Please return here or go to the Emergency Department for any concerns or worsening of condition.    Thank you for choosing [facility]!    Our goal in the Urgent Care is to always provide outstanding medical care. You may receive a survey by mail or e-mail in the next week regarding your experience today. We would greatly appreciate you completing and returning the survey. Your feedback provides us with a way to recognize our staff who provide very good care, and it helps us learn how to improve when your experience was below our aspiration of excellence.      We appreciate you trusting us with your medical care. We hope you feel better soon. We will be happy to take care of you for all of your future medical needs.    Sincerely,    JAYLON Perry      Access Navigator team who handles Medicaid referrals INTO [facility]. The main line for that team (for your referrals into [facility]) is  [phone] Access Line - helps Medicaid patients to find Medicaid care OUTSIDE of [facility]. Medicaid Access Line contact is: [phone]  www.[address] [place] [address].org - [phone] - a community line that can help refer   [facility]:  Marketplace and Medicaid Enrollment Assistance, Free or Low-Cost Care  Name Address Phone # Type of Care  [facility]  [facility] [place] [address] [place] [address] [phone] Primary Care  [facility] [place] [address], [place] [address] [phone] Primary Care  [facility] - [place] [address], [place] [address] [phone] Primary Care  [facility] - [place] [address] [place] [address] [phone] Primary Care  [facility] [address] [place] [address] [phone] Primary Care  [facility] [address], [place]  [address] [phone] Primary Care  [facility]. - Health Care  for the Homeless ([place])  [address], [place] [address]  [phone] Primary Care  [facility] ([place]) [address] [facility] [place] [address] [phone] Primary Care  [facility] [place] [address] [place]  [address]  [phone] Primary Care  [facility]  [facility] [address] [facility] [place] [address] [phone] Primary Care  [facility] [address], [place]  [address] [phone] Primary Care  [facility] [address] [place] [address] [phone] [place] [address] [address], [address] [id number] [phone] [place] [address], [place] [address]  [phone] Primary Care  \[facility]\"" [facility] ([place]) [address] [place] [address] [phone] Primary Care  [facility] - Medical Home Care [address] [place] [address] [phone] Primary Care  [facility] ([place]) [address] [place]  [address]  [phone] Primary Care  NO/AIDS Task Force ([facility]) [address] [place]  [address]  [phone] HIV/AIDS care  [facility] ([facility])  [address] [facility], [address] [place] [address]  [phone] Primary Care  [facility] ([place])  [address] [place] [address] [phone] Primary Care  [facility] ([address]) [address] [place] [address] [phone] Primary Care  [facility] ([address]) [address] [facility] [place] [address] [phone] Primary Care  [facility]  ([facility])  [address] [place] [address] [phone] Primary Care  [facility] ([facility]) [address] [place] [address] [phone] Primary Care  [facility] [address], [place] [address] [phone] Primary Care  [facility] [address], [address]  [place] [address]  [phone] Primary Care  Affordable Care Act Navigators  Navigators are individuals or organizations that are trained to help consumers, small businesses, and  their employees as they look for health coverage options through the Marketplace, including completing  eligibility and enrollment forms. These individuals and organizations are required to be unbiased. Their  services are free to consumers.  [facility] [place]  ([facility])  Request an appointment through:  [email or web address] or [phone]  [address] [place] [address] (office covers all of [facility])  [facility] [address] [place] [address]  10am -7pm Monday to Friday  10am to 2pm on Saturdays  [phone]  [email or web address]

## 2023-06-06 ENCOUNTER — OFFICE VISIT (OUTPATIENT)
Dept: URGENT CARE | Facility: CLINIC | Age: 28
End: 2023-06-06
Payer: COMMERCIAL

## 2023-06-06 VITALS
BODY MASS INDEX: 35.34 KG/M2 | OXYGEN SATURATION: 98 % | HEIGHT: 60 IN | SYSTOLIC BLOOD PRESSURE: 117 MMHG | HEART RATE: 95 BPM | TEMPERATURE: 99 F | WEIGHT: 180 LBS | DIASTOLIC BLOOD PRESSURE: 80 MMHG

## 2023-06-06 DIAGNOSIS — R53.83 FATIGUE, UNSPECIFIED TYPE: ICD-10-CM

## 2023-06-06 DIAGNOSIS — E86.0 DEHYDRATION: ICD-10-CM

## 2023-06-06 DIAGNOSIS — R19.7 DIARRHEA, UNSPECIFIED TYPE: ICD-10-CM

## 2023-06-06 DIAGNOSIS — R11.2 NAUSEA AND VOMITING, UNSPECIFIED VOMITING TYPE: ICD-10-CM

## 2023-06-06 DIAGNOSIS — A08.4 VIRAL GASTROENTERITIS: Primary | ICD-10-CM

## 2023-06-06 LAB — GLUCOSE SERPL-MCNC: 109 MG/DL (ref 70–110)

## 2023-06-06 PROCEDURE — 82962 POCT GLUCOSE, HAND-HELD DEVICE: ICD-10-PCS | Mod: S$GLB,,, | Performed by: NURSE PRACTITIONER

## 2023-06-06 PROCEDURE — 99214 OFFICE O/P EST MOD 30 MIN: CPT | Mod: S$GLB,,, | Performed by: NURSE PRACTITIONER

## 2023-06-06 PROCEDURE — S0119 ONDANSETRON 4 MG: HCPCS | Mod: S$GLB,,, | Performed by: NURSE PRACTITIONER

## 2023-06-06 PROCEDURE — 82962 GLUCOSE BLOOD TEST: CPT | Mod: S$GLB,,, | Performed by: NURSE PRACTITIONER

## 2023-06-06 PROCEDURE — 99214 PR OFFICE/OUTPT VISIT, EST, LEVL IV, 30-39 MIN: ICD-10-PCS | Mod: S$GLB,,, | Performed by: NURSE PRACTITIONER

## 2023-06-06 PROCEDURE — S0119 PR ONDANSETRON, ORAL, 4MG: ICD-10-PCS | Mod: S$GLB,,, | Performed by: NURSE PRACTITIONER

## 2023-06-06 RX ORDER — TRANEXAMIC ACID 650 MG/1
1300 TABLET ORAL 3 TIMES DAILY
COMMUNITY
Start: 2023-03-29

## 2023-06-06 RX ORDER — LACTULOSE 10 G/15ML
15 SOLUTION ORAL; RECTAL
COMMUNITY
Start: 2023-01-10

## 2023-06-06 RX ORDER — ONDANSETRON 4 MG/1
4 TABLET, ORALLY DISINTEGRATING ORAL
Status: COMPLETED | OUTPATIENT
Start: 2023-06-06 | End: 2023-06-06

## 2023-06-06 RX ORDER — ERGOCALCIFEROL 1.25 MG/1
50000 CAPSULE ORAL
COMMUNITY
Start: 2023-03-13

## 2023-06-06 RX ORDER — CYCLOBENZAPRINE HCL 10 MG
10 TABLET ORAL
COMMUNITY
Start: 2023-05-01 | End: 2023-10-28

## 2023-06-06 RX ORDER — NAPROXEN 500 MG/1
500 TABLET ORAL 2 TIMES DAILY
COMMUNITY
Start: 2023-01-10 | End: 2023-10-28

## 2023-06-06 RX ORDER — SODIUM CHLORIDE 9 MG/ML
INJECTION, SOLUTION INTRAVENOUS
Status: COMPLETED | OUTPATIENT
Start: 2023-06-06 | End: 2023-06-06

## 2023-06-06 RX ORDER — TOPIRAMATE 25 MG/1
50 TABLET ORAL
COMMUNITY
Start: 2023-03-22

## 2023-06-06 RX ORDER — ONDANSETRON 4 MG/1
4 TABLET, ORALLY DISINTEGRATING ORAL 2 TIMES DAILY PRN
Qty: 4 TABLET | Refills: 0 | Status: SHIPPED | OUTPATIENT
Start: 2023-06-06 | End: 2023-06-08

## 2023-06-06 RX ORDER — MELOXICAM 15 MG/1
15 TABLET ORAL
COMMUNITY
Start: 2023-05-01

## 2023-06-06 RX ADMIN — SODIUM CHLORIDE 500 ML/HR: 9 INJECTION, SOLUTION INTRAVENOUS at 01:06

## 2023-06-06 RX ADMIN — ONDANSETRON 4 MG: 4 TABLET, ORALLY DISINTEGRATING ORAL at 10:06

## 2023-06-06 NOTE — PROGRESS NOTES
Subjective:      Patient ID: Velvet Kramer is a 28 y.o. female.    Vitals:  height is 5' (1.524 m) and weight is 81.6 kg (180 lb). Her temperature is 99.2 °F (37.3 °C). Her blood pressure is 117/80 and her pulse is 95. Her oxygen saturation is 98%.     Chief Complaint: Fatigue    Patient started feeling weak and felt like she was going to faint at 2 in the morning at work.  Patient started drinking a coke and chocolate to feel better no relief         28-year-old female presents to clinic with complaints of feeling weak and lightheaded around 2 am this morning with onset of diarrhea. She decided to drink a coke and eat chocolate which helped her feel better. Her last meal consists of a tuna sandwich purchased from [employer] around 7pm.      Fatigue  This is a new problem. Associated symptoms include chills, fatigue, nausea and vomiting. She has tried eating for the symptoms.     Constitution: Positive for chills, fatigue and generalized weakness.   Gastrointestinal:  Positive for nausea and vomiting.        Episode of emesis while in clinic exam room   Neurological:  Positive for dizziness and light-headedness.    Objective:     Physical Exam   Constitutional: She is oriented to person, place, and time. She appears well-developed. She appears ill.   HENT:   Head: Normocephalic and atraumatic.   Ears:   Right Ear: External ear normal.   Left Ear: External ear normal.   Nose: Nose normal.   Mouth/Throat: Mucous membranes are dry.   Eyes: Conjunctivae and lids are normal. Extraocular movement intact   Neck: Trachea normal. Neck supple.   Cardiovascular: Normal rate, regular rhythm and normal heart sounds.   Pulmonary/Chest: Effort normal and breath sounds normal. No respiratory distress.   Abdominal: Bowel sounds are normal. She exhibits no distension and no mass. Soft. There is no abdominal tenderness.   Musculoskeletal: Normal range of motion.         General: Normal range of motion.   Neurological:  She is alert and oriented to person, place, and time. She has normal strength.   Skin: Skin is warm, dry, intact, not diaphoretic and not pale.   Psychiatric: Her speech is normal and behavior is normal. Judgment and thought content normal.   Nursing note and vitals reviewed.    Patient reports feeling better post IVF infusion and zofran    Assessment:     1. Viral gastroenteritis    2. Fatigue, unspecified type    3. Nausea and vomiting, unspecified vomiting type    4. Diarrhea, unspecified type    5. Dehydration      Results for orders placed or performed in visit on 06/06/23   POCT Glucose, Hand-Held Device   Result Value Ref Range    POC Glucose 109 70 - 110 MG/DL      Plan:       Viral gastroenteritis    Fatigue, unspecified type  -     POCT Glucose, Hand-Held Device    Nausea and vomiting, unspecified vomiting type  -     ondansetron disintegrating tablet 4 mg  -     0.9%  NaCl infusion    Diarrhea, unspecified type  -     0.9%  NaCl infusion    Dehydration  -     0.9%  NaCl infusion      20 g IV inserted via left hand x 1 attempt. 1L 0.9% normal saline infusing without difficulty, catheter d/c'd tip intact, band-aid applied to site area without infiltration.            Patient Instructions   Gastroenteritis  If your condition worsens or fails to improve we recommend that you receive another evaluation at the ER immediately or contact your PCP to discuss your concerns or return here. You must understand that you've received an urgent care treatment only and that you may be released before all your medical problems are known or treated. You the patient will arrange for followup care as instructed.   If you have diarrhea you can use Pepto Bismol or Imodium A-D as directed  Increase fluids and rest is important   Start off with liquid diet and progress as tolerated (see below)  Water and clear liquids are important so you do not get dehydrated. Drink a small amount at a time.  Do not force yourself to eat,  especially if you have cramps, vomiting, or diarrhea. When you finally decide to start eating, do not eat large amounts at a time, even if you are hungry.  If you eat, avoid fatty, greasy, spicy, or fried foods.  Do not eat dairy products if you have diarrhea; they can make the diarrhea worse  Watch for any increase pain, fever, localized pain to right lower abdomen or continued vomiting or diarrhea.

## 2023-06-06 NOTE — PATIENT INSTRUCTIONS
Gastroenteritis  If your condition worsens or fails to improve we recommend that you receive another evaluation at the ER immediately or contact your PCP to discuss your concerns or return here. You must understand that you've received an urgent care treatment only and that you may be released before all your medical problems are known or treated. You the patient will arrange for followup care as instructed.   If you have diarrhea you can use Pepto Bismol or Imodium A-D as directed  Increase fluids and rest is important   Start off with liquid diet and progress as tolerated (see below)  Water and clear liquids are important so you do not get dehydrated. Drink a small amount at a time.  Do not force yourself to eat, especially if you have cramps, vomiting, or diarrhea. When you finally decide to start eating, do not eat large amounts at a time, even if you are hungry.  If you eat, avoid fatty, greasy, spicy, or fried foods.  Do not eat dairy products if you have diarrhea; they can make the diarrhea worse  Watch for any increase pain, fever, localized pain to right lower abdomen or continued vomiting or diarrhea.

## 2023-06-06 NOTE — LETTER
June 6, 2023      Urgent Care - [address] [address]  Phone: [phone]  Fax: [phone]       Patient: Velvet Kramer   YOB: 1995  Date of Visit: 06/06/2023    To Whom It May Concern:    Mabel Kramer  was at [facility] on 06/06/2023. The patient may return to work/school on 06/08/2023 with no restrictions. If you have any questions or concerns, or if I can be of further assistance, please do not hesitate to contact me.    Sincerely,    Destiny Araujo, NP

## 2023-10-28 ENCOUNTER — HOSPITAL ENCOUNTER (EMERGENCY)
Facility: HOSPITAL | Age: 28
Discharge: HOME | End: 2023-10-28
Attending: EMERGENCY MEDICINE
Payer: COMMERCIAL

## 2023-10-28 VITALS
RESPIRATION RATE: 18 BRPM | SYSTOLIC BLOOD PRESSURE: 133 MMHG | OXYGEN SATURATION: 98 % | TEMPERATURE: 99 F | BODY MASS INDEX: 37.11 KG/M2 | HEART RATE: 79 BPM | WEIGHT: 190 LBS | DIASTOLIC BLOOD PRESSURE: 72 MMHG

## 2023-10-28 DIAGNOSIS — R07.9 CHEST PAIN: ICD-10-CM

## 2023-10-28 DIAGNOSIS — R07.89 CHEST WALL PAIN: Primary | ICD-10-CM

## 2023-10-28 LAB
ALBUMIN SERPL-MCNC: 3.9 G/DL (ref 3.3–5.5)
ALP SERPL-CCNC: 82 U/L (ref 42–141)
B-HCG UR QL: NEGATIVE
BILIRUB SERPL-MCNC: 0.7 MG/DL (ref 0.2–1.6)
BILIRUBIN, POC UA: NEGATIVE
BLOOD, POC UA: ABNORMAL
BUN SERPL-MCNC: 9 MG/DL (ref 7–22)
CALCIUM SERPL-MCNC: 9 MG/DL (ref 8–10.3)
CHLORIDE SERPL-SCNC: 109 MMOL/L (ref 98–108)
CLARITY, POC UA: ABNORMAL
COLOR, POC UA: YELLOW
CREAT SERPL-MCNC: 0.4 MG/DL (ref 0.6–1.2)
CTP QC/QA: YES
GLUCOSE SERPL-MCNC: 93 MG/DL (ref 73–118)
GLUCOSE, POC UA: NEGATIVE
KETONES, POC UA: NEGATIVE
LEUKOCYTE EST, POC UA: NEGATIVE
NITRITE, POC UA: NEGATIVE
PH UR STRIP: 5.5 [PH]
POC ALT (SGPT): 27 U/L (ref 10–47)
POC AST (SGOT): 22 U/L (ref 11–38)
POC B-TYPE NATRIURETIC PEPTIDE: 14 PG/ML (ref 0–100)
POC CARDIAC TROPONIN I: 0 NG/ML (ref 0–0.08)
POC D-DI: 388 NG/ML (ref 0–450)
POC PTINR: 1 (ref 0.9–1.2)
POC PTWBT: 12.2 SEC (ref 9.7–14.3)
POC TCO2: 26 MMOL/L (ref 18–33)
POTASSIUM BLD-SCNC: 3.9 MMOL/L (ref 3.6–5.1)
PROTEIN, POC UA: NEGATIVE
PROTEIN, POC: 7.5 G/DL (ref 6.4–8.1)
SAMPLE: NORMAL
SAMPLE: NORMAL
SODIUM BLD-SCNC: 140 MMOL/L (ref 128–145)
SPECIFIC GRAVITY, POC UA: >=1.03
UROBILINOGEN, POC UA: 0.2 E.U./DL

## 2023-10-28 PROCEDURE — 83880 ASSAY OF NATRIURETIC PEPTIDE: CPT | Mod: ER

## 2023-10-28 PROCEDURE — 93010 EKG 12-LEAD: ICD-10-PCS | Mod: ,,, | Performed by: INTERNAL MEDICINE

## 2023-10-28 PROCEDURE — 84484 ASSAY OF TROPONIN QUANT: CPT | Mod: ER

## 2023-10-28 PROCEDURE — 81025 URINE PREGNANCY TEST: CPT | Mod: ER

## 2023-10-28 PROCEDURE — 93005 ELECTROCARDIOGRAM TRACING: CPT | Mod: ER

## 2023-10-28 PROCEDURE — 99284 EMERGENCY DEPT VISIT MOD MDM: CPT | Mod: 25,ER

## 2023-10-28 PROCEDURE — 80053 COMPREHEN METABOLIC PANEL: CPT | Mod: ER

## 2023-10-28 PROCEDURE — 63600175 PHARM REV CODE 636 W HCPCS: Mod: ER | Performed by: EMERGENCY MEDICINE

## 2023-10-28 PROCEDURE — 93010 ELECTROCARDIOGRAM REPORT: CPT | Mod: ,,, | Performed by: INTERNAL MEDICINE

## 2023-10-28 PROCEDURE — 81025 URINE PREGNANCY TEST: CPT | Mod: ER | Performed by: NURSE PRACTITIONER

## 2023-10-28 PROCEDURE — 96374 THER/PROPH/DIAG INJ IV PUSH: CPT | Mod: ER

## 2023-10-28 RX ORDER — METHOCARBAMOL 750 MG/1
1500 TABLET, FILM COATED ORAL 3 TIMES DAILY
Qty: 30 TABLET | Refills: 0 | Status: SHIPPED | OUTPATIENT
Start: 2023-10-28 | End: 2023-11-02

## 2023-10-28 RX ORDER — NAPROXEN 500 MG/1
500 TABLET ORAL 2 TIMES DAILY WITH MEALS
Qty: 20 TABLET | Refills: 0 | Status: SHIPPED | OUTPATIENT
Start: 2023-10-28

## 2023-10-28 RX ORDER — KETOROLAC TROMETHAMINE 30 MG/ML
10 INJECTION, SOLUTION INTRAMUSCULAR; INTRAVENOUS
Status: COMPLETED | OUTPATIENT
Start: 2023-10-28 | End: 2023-10-28

## 2023-10-28 RX ORDER — DIAZEPAM 2 MG/1
2 TABLET ORAL
Status: DISCONTINUED | OUTPATIENT
Start: 2023-10-28 | End: 2023-10-28

## 2023-10-28 RX ADMIN — KETOROLAC TROMETHAMINE 10 MG: 30 INJECTION, SOLUTION INTRAMUSCULAR; INTRAVENOUS at 01:10

## 2023-10-28 NOTE — ED PROVIDER NOTES
"Encounter Date: 10/28/2023    SCRIBE #1 NOTE: I, Crystal Peguero, am scribing for, and in the presence of,  Sylvia Patel MD. I have scribed the following portions of the note - Other sections scribed: HPI, ROS, PE.       History     Chief Complaint   Patient presents with    Chest Pain     Pt presents to the ED with complaint of CP and SOB onset last night at approx 0100. Pt reports taking a new birthcontrol called Estarylla last Sunday. Pt denies pmhx of blood clot.     Velvet Kramer is a 28 y.o. female, with no pertinent PMHx, who presents to the ED with chest pain that began at approx. 0100 today, waking her up from sleep. Patient reports associated SOB. Patient woke up with same symptoms this AM which prompted her to ED. No relief of symptoms. No attempted Tx. Patient states symptoms "feels like a panic attack". Patient reports starting new birth control pill Rx (Estarylla 28s) 1 week ago prescribed by Dr. Bailey Swann for 30 days of uncontrolled vaginal bleeding. Patient denies prior Hx of HRT. Patient denies Hx of anemia. Additional history is provided by independent historian: patient's partner reports additional chest tightness, and chills which he states is abnormal as patient usually runs warm. Denies PCP. NKDA. No other exacerbating or alleviating factors. Denies BLE swelling, fever, chills, nausea, vomiting or other associated symptoms.       The history is provided by the patient and a significant other. No  was used.     Review of patient's allergies indicates:  No Known Allergies  No past medical history on file.  No past surgical history on file.  Family History   Problem Relation Age of Onset    No Known Problems Mother     No Known Problems Father      Social History     Tobacco Use    Smoking status: Never     Passive exposure: Never    Smokeless tobacco: Never   Substance Use Topics    Alcohol use: Never    Drug use: Never     Review of Systems   Constitutional:  " Negative for chills and fever.        (+) chills   HENT:  Negative for congestion and sore throat.    Eyes:  Negative for pain.   Respiratory:  Positive for chest tightness and shortness of breath. Negative for wheezing.    Cardiovascular:  Positive for chest pain. Negative for leg swelling.   Gastrointestinal:  Negative for abdominal pain, nausea and vomiting.   Genitourinary:  Negative for flank pain.   Musculoskeletal:  Negative for myalgias.   Skin:  Negative for color change.   Neurological:  Negative for weakness and headaches.   Hematological:  Does not bruise/bleed easily.   Psychiatric/Behavioral:  Negative for suicidal ideas.    All other systems reviewed and are negative.      Physical Exam     Initial Vitals [10/28/23 1206]   BP Pulse Resp Temp SpO2   137/84 102 18 98.6 °F (37 °C) 100 %      MAP       --         Physical Exam    Nursing note and vitals reviewed.  Constitutional: She appears well-developed.   HENT:   Head: Normocephalic.   Mouth/Throat: Oropharynx is clear and moist.   Eyes: Conjunctivae are normal.   Normal mucous membranes.    Neck:   Normal range of motion.  Cardiovascular:  Regular rhythm.     Exam reveals no gallop, no friction rub and no decreased pulses.       No murmur heard.  Mildly tachycardic.   Pulmonary/Chest: Breath sounds normal. No respiratory distress. She has no decreased breath sounds. She has no wheezes. She has no rhonchi. She has no rales.   Reproducible anterior chest wall tenderness. Lungs clear to auscultation bilaterally.   Abdominal: Abdomen is soft. She exhibits no distension.   Musculoskeletal:         General: Normal range of motion.      Cervical back: Normal range of motion.      Right lower leg: No swelling. No edema.      Left lower leg: No swelling. No edema.     Neurological: She is alert.   Skin: Skin is warm and dry.   Psychiatric: Her mood appears anxious.         ED Course   Procedures  Labs Reviewed   POCT URINALYSIS W/O SCOPE - Abnormal; Notable  "for the following components:       Result Value    Spec Grav UA >=1.030 (*)     Blood, UA 1+ (*)     All other components within normal limits   POCT CMP - Abnormal; Notable for the following components:    POC Chloride 109 (*)     POC Creatinine 0.4 (*)     All other components within normal limits   TROPONIN ISTAT   POCT CBC   POCT URINE PREGNANCY   POCT URINALYSIS W/O SCOPE   POCT CMP   POCT PROTIME-INR   POCT TROPONIN   POCT D DIMER   POCT B-TYPE NATRIURETIC PEPTIDE (BNP)   ISTAT PROCEDURE   POCT D DIMER   POCT B-TYPE NATRIURETIC PEPTIDE (BNP)     EKG Readings: (Independently Interpreted)   Initial Reading: No STEMI. Heart Rate: 88. Ectopy: No Ectopy. Conduction: Normal.     ECG Results              EKG 12-lead (Final result)  Result time 10/29/23 14:07:04      Final result by Interface, Lab In [facility] (10/29/23 14:07:04)                   Narrative:    Test Reason : R07.9,    Vent. Rate : 088 BPM     Atrial Rate : 088 BPM     P-R Int : 134 ms          QRS Dur : 074 ms      QT Int : 370 ms       P-R-T Axes : 069 094 037 degrees     QTc Int : 447 ms    Normal sinus rhythm  Rightward axis  Borderline Abnormal ECG  When compared with ECG of 24-SEP-2022 16:22,  No significant change was found  Confirmed by Rickey Slaughter MD ([id number]) on 10/29/2023 2:06:57 PM    Referred By: [email or web address]   SELF           Confirmed By:Rickey Slaughter MD                                  Imaging Results              X-Ray Chest PA And Lateral (Final result)  Result time 10/28/23 13:46:58      Final result by Rei Bourgeois MD (10/28/23 13:46:58)                   Impression:      No acute cardiopulmonary finding.      Electronically signed by: Rei Bourgeois MD  Date:    10/28/2023  Time:    13:46               Narrative:    EXAMINATION:  XR CHEST PA AND LATERAL    CLINICAL HISTORY:  Provided history is "Chest Pain;  ".    TECHNIQUE:  Frontal and lateral views of the chest were performed.    COMPARISON:  None.    FINDINGS:  Cardiac " silhouette is not enlarged. No focal consolidation.  No sizable pleural effusion.  No pneumothorax.                                       Medications   ketorolac injection 9.999 mg (9.999 mg Intravenous Given 10/28/23 1346)     Medical Decision Making  This is an emergent evaluation of a 28-year-old woman presenting to the emergency department today secondary to chest pain.  Differential diagnoses included ACS, PE, anxiety reaction, costochondritis, amongst others.  On physical examination, patient was in no acute distress.  She would reproducible tenderness to palpation to the anterior chest wall.  She was mildly anxious appearing and mildly tachycardic.  EKG was obtained and showed a ventricular rate of 88 beats per minute, no STEMI, and no acute findings.  Labs were obtained and were reassuring.  D-dimer was negative.  Troponin was negative.  She was not significantly anemic.  Chest x-ray is pending at this time.  Anticipate discharge soon.    Sylvia Patel MD  1:40 PM  10/28/2023    Chest x-ray showed no acute cardiopulmonary findings per Radiology interpretation.  She was monitored in the emergency department for an extended period of time to ensure no acute findings.  She reported improvement in symptoms with ketorolac.  It is unclear the etiology of her symptoms however no acute, emergent etiology was elucidated.  Patient was amenable to discharge and stated that she had follow-up with primary care.  She stated she also was told previously to follow-up with cardiology and queried whether I felt this was necessary.  Given this is her 2nd evaluation for similar symptoms, I advised her that this would be prudent.  She was given strict return precautions including any worsening of chest pain, any shortness of breath, or for any other concerns.  She voiced understanding of this plan and was discharged home in stable condition.    Sylvia Patel MD  1417PM  10/28/2023          Amount and/or Complexity of Data  Reviewed  Independent Historian:      Details: Partner   Labs: ordered.    Risk  Prescription drug management.            Scribe Attestation:   Scribe #1: I performed the above scribed service and the documentation accurately describes the services I performed. I attest to the accuracy of the note.                      I, Sylvia Camara , personally performed the services described in this documentation.  All medical record entries made by the scribe were at my direction and in my presence.  I have reviewed the chart and agree that the record reflects my personal performance and is accurate and complete.    Clinical Impression:   Final diagnoses:  [R07.9] Chest pain  [R07.89] Chest wall pain (Primary)        ED Disposition Condition    Discharge Stable          ED Prescriptions       Medication Sig Dispense Start Date End Date Auth. Provider    naproxen (NAPROSYN) 500 MG tablet Take 1 tablet (500 mg total) by mouth 2 (two) times daily with meals. 20 tablet 10/28/2023 -- Sylvia Camara MD    methocarbamoL (ROBAXIN) 750 MG Tab Take 2 tablets (1,500 mg total) by mouth 3 (three) times daily. for 5 days 30 tablet 10/28/2023 11/2/2023 Sylvia Camara MD          Follow-up Information       Follow up With Specialties Details Why Contact Info    Your PCP and your Cardiologist as scheduled                 Sylvia Camara MD  10/30/23 0852

## 2023-11-10 ENCOUNTER — OFFICE VISIT (OUTPATIENT)
Dept: CARDIOLOGY | Facility: CLINIC | Age: 28
End: 2023-11-10
Payer: COMMERCIAL

## 2023-11-10 VITALS
OXYGEN SATURATION: 99 % | WEIGHT: 190.06 LBS | HEART RATE: 97 BPM | SYSTOLIC BLOOD PRESSURE: 118 MMHG | DIASTOLIC BLOOD PRESSURE: 70 MMHG | BODY MASS INDEX: 37.31 KG/M2 | HEIGHT: 60 IN

## 2023-11-10 DIAGNOSIS — R07.89 CHEST PAIN, ATYPICAL: ICD-10-CM

## 2023-11-10 DIAGNOSIS — R06.09 DOE (DYSPNEA ON EXERTION): ICD-10-CM

## 2023-11-10 PROCEDURE — 99204 OFFICE O/P NEW MOD 45 MIN: CPT | Mod: S$GLB,,, | Performed by: INTERNAL MEDICINE

## 2023-11-10 PROCEDURE — 1159F PR MEDICATION LIST DOCUMENTED IN MEDICAL RECORD: ICD-10-PCS | Mod: CPTII,S$GLB,, | Performed by: INTERNAL MEDICINE

## 2023-11-10 PROCEDURE — 99204 PR OFFICE/OUTPT VISIT, NEW, LEVL IV, 45-59 MIN: ICD-10-PCS | Mod: S$GLB,,, | Performed by: INTERNAL MEDICINE

## 2023-11-10 PROCEDURE — 99999 PR PBB SHADOW E&M-EST. PATIENT-LVL IV: ICD-10-PCS | Mod: PBBFAC,,, | Performed by: INTERNAL MEDICINE

## 2023-11-10 PROCEDURE — 3008F PR BODY MASS INDEX (BMI) DOCUMENTED: ICD-10-PCS | Mod: CPTII,S$GLB,, | Performed by: INTERNAL MEDICINE

## 2023-11-10 PROCEDURE — 3074F SYST BP LT 130 MM HG: CPT | Mod: CPTII,S$GLB,, | Performed by: INTERNAL MEDICINE

## 2023-11-10 PROCEDURE — 99999 PR PBB SHADOW E&M-EST. PATIENT-LVL IV: CPT | Mod: PBBFAC,,, | Performed by: INTERNAL MEDICINE

## 2023-11-10 PROCEDURE — 1159F MED LIST DOCD IN RCRD: CPT | Mod: CPTII,S$GLB,, | Performed by: INTERNAL MEDICINE

## 2023-11-10 PROCEDURE — 3078F PR MOST RECENT DIASTOLIC BLOOD PRESSURE < 80 MM HG: ICD-10-PCS | Mod: CPTII,S$GLB,, | Performed by: INTERNAL MEDICINE

## 2023-11-10 PROCEDURE — 3078F DIAST BP <80 MM HG: CPT | Mod: CPTII,S$GLB,, | Performed by: INTERNAL MEDICINE

## 2023-11-10 PROCEDURE — 3074F PR MOST RECENT SYSTOLIC BLOOD PRESSURE < 130 MM HG: ICD-10-PCS | Mod: CPTII,S$GLB,, | Performed by: INTERNAL MEDICINE

## 2023-11-10 PROCEDURE — 3008F BODY MASS INDEX DOCD: CPT | Mod: CPTII,S$GLB,, | Performed by: INTERNAL MEDICINE

## 2023-11-10 NOTE — PROGRESS NOTES
"Subjective:   Patient ID:  Velvet Kramer is a 28 y.o. female who presents for evaluation of No chief complaint on file.      HPI    Went to the ER 10/28/23  Velvet Kramer is a 28 y.o. female, with no pertinent PMHx, who presents to the ED with chest pain that began at approx. 0100 today, waking her up from sleep. Patient reports associated SOB. Patient woke up with same symptoms this AM which prompted her to ED. No relief of symptoms. No attempted Tx. Patient states symptoms "feels like a panic attack". Patient reports starting new birth control pill Rx (Estarylla 28s) 1 week ago prescribed by Dr. Bailey Swann for 30 days of uncontrolled vaginal bleeding. Patient denies prior Hx of HRT. Patient denies Hx of anemia. Additional history is provided by independent historian: patient's partner reports additional chest tightness, and chills which he states is abnormal as patient usually runs warm. Denies PCP. NKDA. No other exacerbating or alleviating factors. Denies BLE swelling, fever, chills, nausea, vomiting or other associated symptoms.     EKG 10/28/23 NSR RAD otherwise ok    11/10/23 Still with mild WETZEL and chest tightness  Denies prior cardiac Hx  Not a smoker  Family Hx negative for premature CAD  BP controlled    Review of Systems   Constitutional: Negative for decreased appetite.   HENT:  Negative for ear discharge.    Eyes:  Negative for blurred vision.   Respiratory:  Negative for hemoptysis.    Endocrine: Negative for polyphagia.   Hematologic/Lymphatic: Negative for adenopathy.   Skin:  Negative for color change.   Musculoskeletal:  Negative for joint swelling.   Genitourinary:  Negative for bladder incontinence.   Neurological:  Negative for brief paralysis.   Psychiatric/Behavioral:  Negative for hallucinations.    Allergic/Immunologic: Negative for hives.       Objective:   Physical Exam  Constitutional:       Appearance: She is well-developed.   HENT:      Head: " Normocephalic and atraumatic.   Eyes:      Conjunctiva/sclera: Conjunctivae normal.      Pupils: Pupils are equal, round, and reactive to light.   Cardiovascular:      Rate and Rhythm: Normal rate.      Pulses: Intact distal pulses.      Heart sounds: Normal heart sounds.   Pulmonary:      Effort: Pulmonary effort is normal.      Breath sounds: Normal breath sounds.   Abdominal:      General: Bowel sounds are normal.      Palpations: Abdomen is soft.   Musculoskeletal:         General: Normal range of motion.      Cervical back: Normal range of motion and neck supple.   Skin:     General: Skin is warm and dry.   Neurological:      Mental Status: She is alert and oriented to person, place, and time.         Assessment:      1. WETZEL (dyspnea on exertion)    2. Chest pain, atypical        Plan:     Echo and treadmill stress test for CP and WETZEL

## 2023-11-17 ENCOUNTER — HOSPITAL ENCOUNTER (OUTPATIENT)
Dept: CARDIOLOGY | Facility: HOSPITAL | Age: 28
Discharge: HOME | End: 2023-11-17
Attending: INTERNAL MEDICINE
Payer: COMMERCIAL

## 2023-11-17 DIAGNOSIS — R07.89 CHEST PAIN, ATYPICAL: ICD-10-CM

## 2023-11-17 DIAGNOSIS — R06.09 DOE (DYSPNEA ON EXERTION): ICD-10-CM

## 2023-11-17 PROCEDURE — 93306 ECHO (CUPID ONLY): ICD-10-PCS | Mod: 26,,, | Performed by: INTERNAL MEDICINE

## 2023-11-17 PROCEDURE — 93017 CV STRESS TEST TRACING ONLY: CPT

## 2023-11-17 PROCEDURE — 93306 TTE W/DOPPLER COMPLETE: CPT | Mod: 26,,, | Performed by: INTERNAL MEDICINE

## 2023-11-17 PROCEDURE — 93306 TTE W/DOPPLER COMPLETE: CPT

## 2023-11-17 PROCEDURE — 93016 EXERCISE STRESS - EKG (CUPID ONLY): ICD-10-PCS | Mod: ,,, | Performed by: INTERNAL MEDICINE

## 2023-11-17 PROCEDURE — 93018 EXERCISE STRESS - EKG (CUPID ONLY): ICD-10-PCS | Mod: ,,, | Performed by: INTERNAL MEDICINE

## 2023-11-17 PROCEDURE — 93016 CV STRESS TEST SUPVJ ONLY: CPT | Mod: ,,, | Performed by: INTERNAL MEDICINE

## 2023-11-17 PROCEDURE — 93018 CV STRESS TEST I&R ONLY: CPT | Mod: ,,, | Performed by: INTERNAL MEDICINE

## 2023-11-19 LAB
ASCENDING AORTA: 2.24 CM
AV INDEX (PROSTH): 0.76
AV MEAN GRADIENT: 5 MMHG
AV PEAK GRADIENT: 8 MMHG
AV VALVE AREA BY VELOCITY RATIO: 1.93 CM²
AV VALVE AREA: 1.81 CM²
AV VELOCITY RATIO: 0.81
CV ECHO LV RWT: 0.34 CM
DOP CALC AO PEAK VEL: 1.44 M/S
DOP CALC AO VTI: 26 CM
DOP CALC LVOT AREA: 2.4 CM2
DOP CALC LVOT DIAMETER: 1.74 CM
DOP CALC LVOT PEAK VEL: 1.17 M/S
DOP CALC LVOT STROKE VOLUME: 47.06 CM3
DOP CALC MV VTI: 14.6 CM
DOP CALCLVOT PEAK VEL VTI: 19.8 CM
E WAVE DECELERATION TIME: 163.37 MSEC
E/A RATIO: 1.21
E/E' RATIO: 7.52 M/S
ECHO LV POSTERIOR WALL: 0.76 CM (ref 0.6–1.1)
FRACTIONAL SHORTENING: 33 % (ref 28–44)
INTERVENTRICULAR SEPTUM: 0.85 CM (ref 0.6–1.1)
IVC DIAMETER: 1.81 CM
LA MAJOR: 5.17 CM
LA MINOR: 4.77 CM
LA WIDTH: 3.9 CM
LEFT ATRIUM SIZE: 2.96 CM
LEFT ATRIUM VOLUME: 48.69 CM3
LEFT INTERNAL DIMENSION IN SYSTOLE: 2.94 CM (ref 2.1–4)
LEFT VENTRICLE DIASTOLIC VOLUME: 88.2 ML
LEFT VENTRICLE SYSTOLIC VOLUME: 33.36 ML
LEFT VENTRICULAR INTERNAL DIMENSION IN DIASTOLE: 4.41 CM (ref 3.5–6)
LEFT VENTRICULAR MASS: 110.76 G
LV LATERAL E/E' RATIO: 7.23 M/S
LV SEPTAL E/E' RATIO: 7.83 M/S
LVOT MG: 2.97 MMHG
LVOT MV: 0.81 CM/S
MV MEAN GRADIENT: 2 MMHG
MV PEAK A VEL: 0.78 M/S
MV PEAK E VEL: 0.94 M/S
MV PEAK GRADIENT: 3 MMHG
MV STENOSIS PRESSURE HALF TIME: 47.38 MS
MV VALVE AREA BY CONTINUITY EQUATION: 3.22 CM2
MV VALVE AREA P 1/2 METHOD: 4.64 CM2
PISA TR MAX VEL: 2.09 M/S
PV PEAK GRADIENT: 7 MMHG
PV PEAK VELOCITY: 1.36 M/S
RA MAJOR: 4.97 CM
RA PRESSURE ESTIMATED: 3 MMHG
RA WIDTH: 3.1 CM
RIGHT VENTRICULAR END-DIASTOLIC DIMENSION: 2.74 CM
RV TB RVSP: 5 MMHG
RV TISSUE DOPPLER FREE WALL SYSTOLIC VELOCITY 1 (APICAL 4 CHAMBER VIEW): 13.37 CM/S
SINUS: 2.26 CM
TDI LATERAL: 0.13 M/S
TDI SEPTAL: 0.12 M/S
TDI: 0.13 M/S
TR MAX PG: 17 MMHG
TRICUSPID ANNULAR PLANE SYSTOLIC EXCURSION: 2.34 CM
TV REST PULMONARY ARTERY PRESSURE: 20 MMHG

## 2023-11-20 LAB
CV STRESS BASE HR: 91 BPM
DIASTOLIC BLOOD PRESSURE: 66 MMHG
OHS CV CPX 1 MINUTE RECOVERY HEART RATE: 120 BPM
OHS CV CPX 85 PERCENT MAX PREDICTED HEART RATE MALE: 162
OHS CV CPX ESTIMATED METS: 7
OHS CV CPX MAX PREDICTED HEART RATE: 190
OHS CV CPX PATIENT IS FEMALE: 1
OHS CV CPX PATIENT IS MALE: 0
OHS CV CPX PEAK DIASTOLIC BLOOD PRESSURE: 65 MMHG
OHS CV CPX PEAK HEAR RATE: 134 BPM
OHS CV CPX PEAK RATE PRESSURE PRODUCT: NORMAL
OHS CV CPX PEAK SYSTOLIC BLOOD PRESSURE: 214 MMHG
OHS CV CPX PERCENT MAX PREDICTED HEART RATE ACHIEVED: 75
OHS CV CPX RATE PRESSURE PRODUCT PRESENTING: NORMAL
STRESS ECHO POST EXERCISE DUR MIN: 6 MINUTES
STRESS ECHO POST EXERCISE DUR SEC: 0 SECONDS
SYSTOLIC BLOOD PRESSURE: 113 MMHG

## 2024-04-03 ENCOUNTER — OFFICE VISIT (OUTPATIENT)
Dept: CARDIOLOGY | Facility: CLINIC | Age: 29
End: 2024-04-03
Payer: COMMERCIAL

## 2024-04-03 VITALS
BODY MASS INDEX: 37.31 KG/M2 | DIASTOLIC BLOOD PRESSURE: 70 MMHG | OXYGEN SATURATION: 99 % | HEART RATE: 90 BPM | WEIGHT: 190.06 LBS | SYSTOLIC BLOOD PRESSURE: 100 MMHG | HEIGHT: 60 IN

## 2024-04-03 DIAGNOSIS — R07.89 CHEST PAIN, ATYPICAL: ICD-10-CM

## 2024-04-03 DIAGNOSIS — R06.09 DOE (DYSPNEA ON EXERTION): Primary | ICD-10-CM

## 2024-04-03 PROCEDURE — 3074F SYST BP LT 130 MM HG: CPT | Mod: CPTII,S$GLB,, | Performed by: INTERNAL MEDICINE

## 2024-04-03 PROCEDURE — 99213 OFFICE O/P EST LOW 20 MIN: CPT | Mod: S$GLB,,, | Performed by: INTERNAL MEDICINE

## 2024-04-03 PROCEDURE — 3008F BODY MASS INDEX DOCD: CPT | Mod: CPTII,S$GLB,, | Performed by: INTERNAL MEDICINE

## 2024-04-03 PROCEDURE — 99999 PR PBB SHADOW E&M-EST. PATIENT-LVL IV: CPT | Mod: PBBFAC,,, | Performed by: INTERNAL MEDICINE

## 2024-04-03 PROCEDURE — 3078F DIAST BP <80 MM HG: CPT | Mod: CPTII,S$GLB,, | Performed by: INTERNAL MEDICINE

## 2024-04-03 PROCEDURE — 1159F MED LIST DOCD IN RCRD: CPT | Mod: CPTII,S$GLB,, | Performed by: INTERNAL MEDICINE

## 2024-04-03 NOTE — PROGRESS NOTES
"Subjective   Patient ID:  Velvet Kramer is a 28 y.o. female who presents for follow-up of No chief complaint on file.      HPI      Went to the ER 10/28/23  Velvet Kramer is a 28 y.o. female, with no pertinent PMHx, who presents to the ED with chest pain that began at approx. 0100 today, waking her up from sleep. Patient reports associated SOB. Patient woke up with same symptoms this AM which prompted her to ED. No relief of symptoms. No attempted Tx. Patient states symptoms "feels like a panic attack". Patient reports starting new birth control pill Rx (Estarylla 28s) 1 week ago prescribed by Dr. Bailey Swann for 30 days of uncontrolled vaginal bleeding. Patient denies prior Hx of HRT. Patient denies Hx of anemia. Additional history is provided by independent historian: patient's partner reports additional chest tightness, and chills which he states is abnormal as patient usually runs warm. Denies PCP. NKDA. No other exacerbating or alleviating factors. Denies BLE swelling, fever, chills, nausea, vomiting or other associated symptoms.      EKG 10/28/23 NSR RAD otherwise ok    Stress test 11/17/23    The patient exercised for 6 minutes 0 seconds on a Brandon protocol, corresponding to a functional capacity of 7.0 METS, achieving a peak heart rate of 134 bpm, which is 75 % of the age predicted maximum heart rate. The patient experienced no angina during the test.    The ECG portion of the study is negative for ischemia.    The patient reported no chest pain during the stress test.    Echo 11/17/23    Left Ventricle: The left ventricle is normal in size. Normal wall thickness. Normal wall motion. There is normal systolic function with a visually estimated ejection fraction of 55 - 60%. There is normal diastolic function.    Right Ventricle: Normal right ventricular cavity size. Wall thickness is normal. Right ventricle wall motion  is normal. Systolic function is normal.    Pulmonary " Artery: The estimated pulmonary artery systolic pressure is 20 mmHg.    IVC/SVC: Normal venous pressure at 3 mmHg.        11/10/23 Still with mild WETZEL and chest tightness  Denies prior cardiac Hx  Not a smoker  Family Hx negative for premature CAD  BP controlled  Echo and treadmill stress test for CP and WETZEL     4/3/24 Cp improved. Denies CP  BP low normal    Review of Systems   Constitutional: Negative for decreased appetite.   HENT:  Negative for ear discharge.    Eyes:  Negative for blurred vision.   Respiratory:  Negative for hemoptysis.    Endocrine: Negative for polyphagia.   Hematologic/Lymphatic: Negative for adenopathy.   Skin:  Negative for color change.   Musculoskeletal:  Negative for joint swelling.   Genitourinary:  Negative for bladder incontinence.   Neurological:  Negative for brief paralysis.   Psychiatric/Behavioral:  Negative for hallucinations.    Allergic/Immunologic: Negative for hives.          Objective     Physical Exam  Constitutional:       Appearance: She is well-developed.   HENT:      Head: Normocephalic and atraumatic.   Eyes:      Conjunctiva/sclera: Conjunctivae normal.      Pupils: Pupils are equal, round, and reactive to light.   Cardiovascular:      Rate and Rhythm: Normal rate.      Pulses: Intact distal pulses.      Heart sounds: Normal heart sounds.   Pulmonary:      Effort: Pulmonary effort is normal.      Breath sounds: Normal breath sounds.   Abdominal:      General: Bowel sounds are normal.      Palpations: Abdomen is soft.   Musculoskeletal:         General: Normal range of motion.      Cervical back: Normal range of motion and neck supple.   Skin:     General: Skin is warm and dry.   Neurological:      Mental Status: She is alert and oriented to person, place, and time.            Assessment and Plan     1. WETZEL (dyspnea on exertion)    2. Chest pain, atypical        Plan:     Stress test and echo ok - symptoms improved  F/U PRN    Advance Care Planning     Date:  04/03/2024  Patient did not wish or was not able to name a surrogate decision maker or provide an Advance Care Plan.

## 2024-05-31 ENCOUNTER — OFFICE VISIT (OUTPATIENT)
Dept: URGENT CARE | Facility: CLINIC | Age: 29
End: 2024-05-31
Payer: COMMERCIAL

## 2024-05-31 VITALS
BODY MASS INDEX: 37.69 KG/M2 | TEMPERATURE: 98 F | DIASTOLIC BLOOD PRESSURE: 80 MMHG | HEIGHT: 60 IN | RESPIRATION RATE: 18 BRPM | OXYGEN SATURATION: 94 % | SYSTOLIC BLOOD PRESSURE: 120 MMHG | HEART RATE: 86 BPM | WEIGHT: 192 LBS

## 2024-05-31 DIAGNOSIS — N64.4 BREAST PAIN, LEFT: ICD-10-CM

## 2024-05-31 DIAGNOSIS — S20.212A CONTUSION OF LEFT CHEST WALL, INITIAL ENCOUNTER: Primary | ICD-10-CM

## 2024-05-31 PROCEDURE — 99203 OFFICE O/P NEW LOW 30 MIN: CPT | Mod: S$GLB,,, | Performed by: EMERGENCY MEDICINE
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
## 2024-05-31 NOTE — LETTER
[facility]  [address], [address]  [place] [address]  Phone: [phone]  Fax: [phone]  [facility]: [id number]    Pt Name: Velvet Kramer  Injury Date: 05/25/2024   Employee ID:  Date of First Treatment: 05/31/2024   Company: [employer]      Appointment Time: 04:10 PM Arrived: 04:10 PM   Provider: Marcos Henson MD Time Out:04:39 PM     Office Treatment:   1. Contusion of left chest wall, initial encounter    2. Breast pain, left          Patient Instructions: Attention not to aggravate affected area    Restrictions: Regular Duty (return to work 6/3/2024. no restrictions)     Return Appointment: if symptoms worsen or fail to improve

## 2024-06-02 ENCOUNTER — TELEPHONE (OUTPATIENT)
Dept: URGENT CARE | Facility: CLINIC | Age: 29
End: 2024-06-02
Payer: COMMERCIAL
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
## 2024-06-12 ENCOUNTER — OFFICE VISIT (OUTPATIENT)
Dept: URGENT CARE | Facility: CLINIC | Age: 29
End: 2024-06-12
Payer: COMMERCIAL

## 2024-06-12 VITALS
OXYGEN SATURATION: 97 % | SYSTOLIC BLOOD PRESSURE: 133 MMHG | HEART RATE: 73 BPM | HEIGHT: 60 IN | TEMPERATURE: 98 F | DIASTOLIC BLOOD PRESSURE: 85 MMHG | BODY MASS INDEX: 35.34 KG/M2 | WEIGHT: 180 LBS | RESPIRATION RATE: 18 BRPM

## 2024-06-12 DIAGNOSIS — N64.52 DISCHARGE FROM BREAST: ICD-10-CM

## 2024-06-12 DIAGNOSIS — N61.0 MASTITIS IN FEMALE: ICD-10-CM

## 2024-06-12 DIAGNOSIS — S29.9XXA INJURY OF LEFT BREAST, INITIAL ENCOUNTER: Primary | ICD-10-CM

## 2024-06-12 DIAGNOSIS — S20.02XA CONTUSION OF LEFT BREAST, INITIAL ENCOUNTER: ICD-10-CM

## 2024-06-12 LAB
B-HCG UR QL: NEGATIVE
CTP QC/QA: YES

## 2024-06-12 PROCEDURE — 87075 CULTR BACTERIA EXCEPT BLOOD: CPT | Performed by: FAMILY MEDICINE

## 2024-06-12 PROCEDURE — 81025 URINE PREGNANCY TEST: CPT | Mod: S$GLB,,, | Performed by: FAMILY MEDICINE

## 2024-06-12 PROCEDURE — 87070 CULTURE OTHR SPECIMN AEROBIC: CPT | Performed by: FAMILY MEDICINE

## 2024-06-12 PROCEDURE — 99213 OFFICE O/P EST LOW 20 MIN: CPT | Mod: S$GLB,,, | Performed by: FAMILY MEDICINE

## 2024-06-12 RX ORDER — GABAPENTIN 300 MG/1
300 CAPSULE ORAL 3 TIMES DAILY
COMMUNITY
Start: 2024-05-17

## 2024-06-12 RX ORDER — CEPHALEXIN 500 MG/1
500 CAPSULE ORAL EVERY 8 HOURS
Qty: 21 CAPSULE | Refills: 0 | Status: SHIPPED | OUTPATIENT
Start: 2024-06-12 | End: 2024-06-19

## 2024-06-12 RX ORDER — CEPHALEXIN 500 MG/1
500 CAPSULE ORAL EVERY 8 HOURS
Qty: 21 CAPSULE | Refills: 0 | Status: SHIPPED | OUTPATIENT
Start: 2024-06-12 | End: 2024-06-12
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
## 2024-06-13 ENCOUNTER — OFFICE VISIT (OUTPATIENT)
Dept: URGENT CARE | Facility: CLINIC | Age: 29
End: 2024-06-13
Payer: COMMERCIAL

## 2024-06-13 VITALS
TEMPERATURE: 98 F | HEIGHT: 60 IN | RESPIRATION RATE: 18 BRPM | DIASTOLIC BLOOD PRESSURE: 71 MMHG | WEIGHT: 180 LBS | HEART RATE: 80 BPM | OXYGEN SATURATION: 99 % | SYSTOLIC BLOOD PRESSURE: 119 MMHG | BODY MASS INDEX: 35.34 KG/M2

## 2024-06-13 DIAGNOSIS — N61.0 INFECTION OF LEFT BREAST: ICD-10-CM

## 2024-06-13 DIAGNOSIS — Z02.6 ENCOUNTER RELATED TO WORKER'S COMPENSATION CLAIM: ICD-10-CM

## 2024-06-13 DIAGNOSIS — N64.52 DISCHARGE OF BREAST: ICD-10-CM

## 2024-06-13 DIAGNOSIS — S29.9XXD: Primary | ICD-10-CM

## 2024-06-13 PROCEDURE — 99213 OFFICE O/P EST LOW 20 MIN: CPT | Mod: S$GLB,,, | Performed by: PHYSICIAN ASSISTANT

## 2024-06-13 NOTE — LETTER
[facility]  [address] [address]  Phone: [phone]  Fax: [phone]  [facility]: [id number]    Pt Name: Velvet Kramer  Injury Date: 05/25/2024   Employee ID:  Date of First Treatment: 06/13/2024   Company: [employer]      Appointment Time:  Arrived: 8:20 AM   Provider: Bailey Fernandez PA-C Time Out: 9:32 AM      Office Treatment:   1. Injury of left breast, subsequent encounter    2. Infection of left breast    3. Discharge of breast    4. Encounter related to worker's compensation claim          Patient Instructions: Attention not to aggravate affected area (Warm compresses to the left breast.)      Restrictions: No lifting/pushing/pulling more than 10 lbs, Sit or stand as needed     Return Appointment: 6/20/2024 at 9:45 AM BRIGID

## 2024-06-13 NOTE — PROGRESS NOTES
Subjective:      Patient ID: Velvet Kramer is a 29 y.o. female.    Chief Complaint: Breast Pain (LT)    Ms. Kramer presents for follow up of left breast injury, DOI 5/25/24.  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  She was initially seen 5/31/24 and diagnosed with contusion.  She was discharged back to regular duty that day.  She reports she continued to have left breast pain and a few days ago, she started having discharge from her nipple.  The discharge had a foul odor.  She was seen yesterday in urgent care and diagnosed with infection of the breast.  She was given Rx for keflex and cultures of the discharge were taken.  Pregnancy test was negative.  She reports she has never had surgery on her breast, no history of nipple discharge, no history of pituitary dysfunction and no history of taking hormones.   She does not have any discharge from the right nipple.  She denies any fevers or chills or redness of the breast.  The left breast is sore when she is doing tasks with her left arm.  She has not yet filled the Rx for keflex.  She has ibuprofen 800mg at home, but has not taken any ibuprofen.    See MA note below.  Patient's place of employment - [employer]  Patient's job title - Caregiver  Date of Injury - 05-25-24  Body part injured - LT Breast  Current work status per last visit -  Regular Duty (return to work 6/3/2024. no restrictions)  Improved, same, or worse - Worse  Pain Scale right now (1-10) -  7/10      Constitution: Negative.   HENT: Negative.     Neck: neck negative.   Cardiovascular: Negative.    Respiratory: Negative.     Musculoskeletal:  Positive for pain, trauma and muscle ache. Negative for joint pain, abnormal ROM of joint and muscle cramps.   Skin:  Negative for erythema and bruising.   Neurological:  Negative for numbness and tingling.     Objective:     Physical Exam  Vitals and nursing note reviewed.   Constitutional:       General: She is not in acute  distress.     Appearance: Normal appearance. She is not ill-appearing.   HENT:      Head: Normocephalic and atraumatic.      Right Ear: Tympanic membrane, ear canal and external ear normal. There is no impacted cerumen.      Left Ear: Tympanic membrane, ear canal and external ear normal. There is no impacted cerumen.      Nose: Nose normal. No congestion or rhinorrhea.      Mouth/Throat:      Mouth: Mucous membranes are moist.      Pharynx: No oropharyngeal exudate or posterior oropharyngeal erythema.   Eyes:      Extraocular Movements: Extraocular movements intact.      Conjunctiva/sclera: Conjunctivae normal.      Pupils: Pupils are equal, round, and reactive to light.   Cardiovascular:      Rate and Rhythm: Normal rate and regular rhythm.      Pulses: Normal pulses.      Heart sounds: Normal heart sounds.   Pulmonary:      Effort: Pulmonary effort is normal.      Breath sounds: Normal breath sounds.   Chest:      Chest wall: No tenderness.   Breasts:     Left: Nipple discharge and tenderness present. No swelling, bleeding, inverted nipple, mass or skin change.      Comments: There is no erythema, ecchymosis or swelling of the left breast.  No skin changes appreciated.  There is TTP mostly to the LOQ with some underlying induration, but no circumscribed lesion.    TTP to the areola and nipple.  Areola and nipple appear normal.  Patient is able to express a small amount of thick, whiteish discharge.  Abdominal:      General: Bowel sounds are normal.      Palpations: Abdomen is soft.   Musculoskeletal:         General: Normal range of motion.      Cervical back: Normal range of motion.   Skin:     General: Skin is warm.      Capillary Refill: Capillary refill takes less than 2 seconds.      Findings: No erythema.   Neurological:      General: No focal deficit present.      Mental Status: She is alert and oriented to person, place, and time.   Psychiatric:         Mood and Affect: Mood normal.         Behavior:  Behavior normal.         Thought Content: Thought content normal.         Judgment: Judgment normal.        Assessment:      1. Injury of left breast, subsequent encounter    2. Infection of left breast    3. Discharge of breast    4. Encounter related to worker's compensation claim      Plan:     I stressed the importance of starting the antibiotics today.  Cultures are pending.  Red flag symptoms were discussed with the patient and she will go to the ED if she experiences these.  She may return to work with a lifting restrictions.  Encouraged her to use ibuprofen PRN as directed on the bottle for pain.     Patient Instructions: Attention not to aggravate affected area (Warm compresses to the left breast.)   Restrictions: No lifting/pushing/pulling more than 10 lbs, Sit or stand as needed  Follow up in about 1 week (around 6/20/2024).

## 2024-06-17 LAB — BACTERIA SPEC AEROBE CULT: NORMAL

## 2024-06-18 ENCOUNTER — TELEPHONE (OUTPATIENT)
Dept: URGENT CARE | Facility: CLINIC | Age: 29
End: 2024-06-18
Payer: COMMERCIAL

## 2024-06-18 LAB — BACTERIA SPEC ANAEROBE CULT: NORMAL

## 2024-06-18 NOTE — TELEPHONE ENCOUNTER
Anaerobic shows: No anaerobes isolated   Aerobic shows: Skin pernell,  no predominant organism     Most recent note with [facility] suggests pt has not started prescribed keflex.    NP called pt.  No answer.  Pt did view results online.    Np left  with call back #.

## 2024-06-18 NOTE — TELEPHONE ENCOUNTER
Anaerobic shows: No anaerobes isolated  Aerobic shows: Skin pernell,  no predominant organism    Np spoke with pt.  She states she has been taking keflex but has had no obvious improvement.    Pt states she has appt this week with [facility].    NP instructed pt to finish abx as directed and keep appt for further workup.

## 2024-06-20 ENCOUNTER — OFFICE VISIT (OUTPATIENT)
Dept: URGENT CARE | Facility: CLINIC | Age: 29
End: 2024-06-20
Payer: COMMERCIAL

## 2024-06-20 VITALS
BODY MASS INDEX: 35.34 KG/M2 | OXYGEN SATURATION: 98 % | SYSTOLIC BLOOD PRESSURE: 121 MMHG | WEIGHT: 180 LBS | RESPIRATION RATE: 18 BRPM | HEIGHT: 60 IN | HEART RATE: 78 BPM | DIASTOLIC BLOOD PRESSURE: 81 MMHG

## 2024-06-20 DIAGNOSIS — S29.9XXD: Primary | ICD-10-CM

## 2024-06-20 DIAGNOSIS — N64.52 DISCHARGE OF BREAST: ICD-10-CM

## 2024-06-20 DIAGNOSIS — N61.0 INFECTION OF LEFT BREAST: ICD-10-CM

## 2024-06-20 DIAGNOSIS — Z02.6 ENCOUNTER RELATED TO WORKER'S COMPENSATION CLAIM: ICD-10-CM
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
## 2024-06-20 NOTE — PROGRESS NOTES
Subjective:      Patient ID: Velvet Kramer is a 29 y.o. female.    Chief Complaint: Breast Pain    Patient's place of employment - [employer]  Patient's job title - Caregiver  Date of Injury - 05-25-24  Body part injured - LT Breast  Current work status per last visit -  Regular Duty   Improved, same, or worse - mild improvement  Pain Scale right now (1-10) -  3/10    KW        Constitution: Negative.   HENT: Negative.     Neck: neck negative.   Cardiovascular: Negative.    Respiratory: Negative.     Musculoskeletal:  Positive for pain, trauma and muscle ache. Negative for joint pain, abnormal ROM of joint and muscle cramps.   Skin:  Negative for erythema and bruising.   Neurological:  Negative for numbness and tingling.     See MA note above. Begin MD note:    Velvet Kramer is a 29 y.o. presenting for follow-up of left breast injury. She completed antibiotic course, continues to do warm compresses. Pain is intermittent with movement and when lying down at night. Continues to note discharge when expressing her nipple, more like clear liquid now     Objective:     Physical Exam  Vitals and nursing note reviewed.   Constitutional:       General: She is not in acute distress.     Appearance: Normal appearance. She is not ill-appearing.   HENT:      Head: Normocephalic and atraumatic.   Eyes:      Extraocular Movements: Extraocular movements intact.      Conjunctiva/sclera: Conjunctivae normal.      Pupils: Pupils are equal, round, and reactive to light.   Cardiovascular:      Rate and Rhythm: Normal rate and regular rhythm.      Pulses: Normal pulses.      Heart sounds: Normal heart sounds.   Pulmonary:      Effort: Pulmonary effort is normal.      Breath sounds: Normal breath sounds.   Chest:      Chest wall: No tenderness.   Breasts:     Left: Nipple discharge present. No swelling, bleeding, inverted nipple, mass, skin change or tenderness.          Comments: There is no  erythema, ecchymosis or swelling of the left breast.  No skin changes appreciated.  There is mild TTP mostly to the LOQ without underlying induration, no circumscribed lesion. Breast is small and  tissue dense.   NTTP to the areola and nipple.  Areola and nipple appear normal.  Patient expresses scant amount of clear fluid and white appearing discharge.  Abdominal:      General: Bowel sounds are normal.      Palpations: Abdomen is soft.   Musculoskeletal:         General: Normal range of motion.      Cervical back: Normal range of motion.   Skin:     General: Skin is warm.      Capillary Refill: Capillary refill takes less than 2 seconds.      Findings: No erythema.   Neurological:      General: No focal deficit present.      Mental Status: She is alert and oriented to person, place, and time.   Psychiatric:         Mood and Affect: Mood normal.         Behavior: Behavior normal.         Thought Content: Thought content normal.         Judgment: Judgment normal.         Assessment:      1. Injury of left breast, subsequent encounter    2. Encounter related to worker's compensation claim    3. Infection of left breast    4. Discharge of breast      Plan:     I reviewed the prior clinic notes related to this injury, culture of breast discharge was negative for microbes. Breast ultrasound ordered to evaluate for any hematoma versus cyst. Work restrictions to be gradually lifted and notes improvements in pain symptoms.      Diagnoses and plan discussed with the patient, as well as the expected course and duration of symptoms. Risks and benefits of any medication prescribed during this visit was explained, verbal instructions on use given. Clinic/Emergency department return precautions were given, can return to [facility] before scheduled follow-up appointment if notes worsening/aggravation of symptoms. All questions and concerns were addressed prior to discharge. Plan was developed with active input from the patient and  they verbalized understanding of and agreement with the POC.  [facility] was informed of any referrals and relevant orders.  Note was dictated with voice recognition software, please excuse any grammatical errors.    I spent a total of 30 minutes on the day of the visit.  This includes face to face time and non-face to face time preparing to see the patient (e.g. review of medical record), obtaining and/or reviewing separately obtained history, documenting clinical information in the electronic or other health record, independently interpreting results and communicating results to the patient/family/caregiver, or care coordinator.    Patient Instructions: Daily home exercises/warm soaks, Attention not to aggravate affected area (Ultrasound to be scheduled when approved. Daily warm compresses to the breast.)   Restrictions: No lifting/pushing/pulling more than 25 lbs, Sit or stand as needed  Follow up in about 1 week (around 6/27/2024) for With Jim.

## 2024-06-26 ENCOUNTER — TELEPHONE (OUTPATIENT)
Dept: URGENT CARE | Facility: CLINIC | Age: 29
End: 2024-06-26
Payer: COMMERCIAL

## 2024-07-08 ENCOUNTER — HOSPITAL ENCOUNTER (OUTPATIENT)
Dept: RADIOLOGY | Facility: HOSPITAL | Age: 29
Discharge: HOME | End: 2024-07-08
Attending: SURGERY
Payer: COMMERCIAL

## 2024-07-08 ENCOUNTER — PATIENT MESSAGE (OUTPATIENT)
Dept: URGENT CARE | Facility: CLINIC | Age: 29
End: 2024-07-08
Payer: COMMERCIAL

## 2024-07-08 DIAGNOSIS — S29.9XXD: ICD-10-CM

## 2024-07-08 DIAGNOSIS — N64.52 DISCHARGE OF BREAST: ICD-10-CM

## 2024-07-08 DIAGNOSIS — Z02.6 ENCOUNTER RELATED TO WORKER'S COMPENSATION CLAIM: ICD-10-CM

## 2024-07-08 DIAGNOSIS — Z02.6 ENCOUNTER RELATED TO WORKER'S COMPENSATION CLAIM: Primary | ICD-10-CM

## 2024-07-08 PROCEDURE — 77066 DX MAMMO INCL CAD BI: CPT | Mod: TC

## 2024-07-08 PROCEDURE — 77062 BREAST TOMOSYNTHESIS BI: CPT | Mod: 26,,, | Performed by: RADIOLOGY

## 2024-07-08 PROCEDURE — 76641 ULTRASOUND BREAST COMPLETE: CPT | Mod: 26,LT,, | Performed by: RADIOLOGY

## 2024-07-08 PROCEDURE — 77062 BREAST TOMOSYNTHESIS BI: CPT | Mod: TC

## 2024-07-08 PROCEDURE — 77066 DX MAMMO INCL CAD BI: CPT | Mod: 26,,, | Performed by: RADIOLOGY

## 2024-07-08 PROCEDURE — 76641 ULTRASOUND BREAST COMPLETE: CPT | Mod: TC,LT

## 2024-07-08 NOTE — TELEPHONE ENCOUNTER
Reviewed Ultrasound results, no suspicious masses. Follow-up for reassessment and likely discharge from Occupational Health.     Imaging  US Breast Left Complete    Result Date: 7/8/2024  Result: Mammo Digital Diagnostic Bilat with Braydon US Breast Left Complete History: Patient is 29 y.o. and is seen for encounter related to worker's compensation claim. Films Compared: None. Findings: Diagnostic Mammogram: This procedure was performed using tomosynthesis. Computer-aided detection was utilized in the interpretation of this examination. The breasts are heterogeneously dense, which may obscure small masses.  There is no evidence of suspicious masses, microcalcifications or architectural distortion.  Breast Ultrasound: Ultrasound evaluation demonstrates no suspicious abnormality. Complete ultrasound was performed for all four breast quadrants and the retroareolar region.     There is no mammographic or sonographic evidence of malignancy. BI-RADS Category 1: Negative Recommendation: Annual mammogram is recommended beginning at age 40. Your estimated lifetime risk of breast cancer (to age 85) based on Tyrer-Cuzick risk assessment model is 12.82%.  According to the American Cancer Society, patients with a lifetime breast cancer risk of 20% or higher might benefit from supplemental screening tests, such as screening breast MRI.

## 2024-12-26 ENCOUNTER — HOSPITAL ENCOUNTER (EMERGENCY)
Facility: HOSPITAL | Age: 29
Discharge: HOME | End: 2024-12-26
Attending: INTERNAL MEDICINE
Payer: COMMERCIAL

## 2024-12-26 VITALS
SYSTOLIC BLOOD PRESSURE: 132 MMHG | OXYGEN SATURATION: 99 % | HEIGHT: 60 IN | HEART RATE: 60 BPM | TEMPERATURE: 98 F | WEIGHT: 180 LBS | DIASTOLIC BLOOD PRESSURE: 70 MMHG | RESPIRATION RATE: 20 BRPM | BODY MASS INDEX: 35.34 KG/M2

## 2024-12-26 DIAGNOSIS — K80.20 CALCULUS OF GALLBLADDER WITHOUT CHOLECYSTITIS WITHOUT OBSTRUCTION: Primary | ICD-10-CM

## 2024-12-26 LAB
ALBUMIN SERPL-MCNC: 4 G/DL (ref 3.3–5.5)
ALBUMIN SERPL-MCNC: 4.2 G/DL (ref 3.3–5.5)
ALP SERPL-CCNC: 77 U/L (ref 42–141)
ALP SERPL-CCNC: 93 U/L (ref 42–141)
BILIRUB SERPL-MCNC: 0.5 MG/DL (ref 0.2–1.6)
BILIRUB SERPL-MCNC: 0.5 MG/DL (ref 0.2–1.6)
BUN SERPL-MCNC: 10 MG/DL (ref 7–22)
CALCIUM SERPL-MCNC: 9.5 MG/DL (ref 8–10.3)
CHLORIDE SERPL-SCNC: 101 MMOL/L (ref 98–108)
CREAT SERPL-MCNC: 0.6 MG/DL (ref 0.6–1.2)
GLUCOSE SERPL-MCNC: 101 MG/DL (ref 73–118)
HCT, POC: NORMAL
HGB, POC: NORMAL (ref 14–18)
MCH, POC: NORMAL
MCHC, POC: NORMAL
MCV, POC: NORMAL
MPV, POC: NORMAL
POC ALT (SGPT): 22 U/L (ref 10–47)
POC ALT (SGPT): 24 U/L (ref 10–47)
POC AMYLASE: 60 U/L (ref 14–97)
POC AST (SGOT): 27 U/L (ref 11–38)
POC AST (SGOT): 32 U/L (ref 11–38)
POC GGT: 23 U/L (ref 5–65)
POC PLATELET COUNT: NORMAL
POC TCO2: 23 MMOL/L (ref 18–33)
POTASSIUM BLD-SCNC: 3.6 MMOL/L (ref 3.6–5.1)
PROTEIN, POC: 8.1 G/DL (ref 6.4–8.1)
PROTEIN, POC: 8.2 G/DL (ref 6.4–8.1)
RBC, POC: NORMAL
RDW, POC: NORMAL
SODIUM BLD-SCNC: 144 MMOL/L (ref 128–145)
WBC, POC: NORMAL

## 2024-12-26 PROCEDURE — 82150 ASSAY OF AMYLASE: CPT | Mod: ER

## 2024-12-26 PROCEDURE — 82040 ASSAY OF SERUM ALBUMIN: CPT | Mod: 59,ER

## 2024-12-26 PROCEDURE — 25000003 PHARM REV CODE 250: Mod: ER | Performed by: INTERNAL MEDICINE

## 2024-12-26 PROCEDURE — 85025 COMPLETE CBC W/AUTO DIFF WBC: CPT | Mod: ER

## 2024-12-26 PROCEDURE — 96374 THER/PROPH/DIAG INJ IV PUSH: CPT | Mod: ER

## 2024-12-26 PROCEDURE — 63600175 PHARM REV CODE 636 W HCPCS: Mod: ER | Performed by: INTERNAL MEDICINE

## 2024-12-26 PROCEDURE — 96375 TX/PRO/DX INJ NEW DRUG ADDON: CPT | Mod: ER

## 2024-12-26 PROCEDURE — 99285 EMERGENCY DEPT VISIT HI MDM: CPT | Mod: 25,ER

## 2024-12-26 PROCEDURE — 80053 COMPREHEN METABOLIC PANEL: CPT | Mod: ER

## 2024-12-26 RX ORDER — ALUMINUM HYDROXIDE, MAGNESIUM HYDROXIDE, AND SIMETHICONE 1200; 120; 1200 MG/30ML; MG/30ML; MG/30ML
30 SUSPENSION ORAL ONCE
Status: COMPLETED | OUTPATIENT
Start: 2024-12-26 | End: 2024-12-26

## 2024-12-26 RX ORDER — HYDROMORPHONE HYDROCHLORIDE 2 MG/ML
1 INJECTION, SOLUTION INTRAMUSCULAR; INTRAVENOUS; SUBCUTANEOUS
Status: COMPLETED | OUTPATIENT
Start: 2024-12-26 | End: 2024-12-26

## 2024-12-26 RX ORDER — KETOROLAC TROMETHAMINE 10 MG/1
10 TABLET, FILM COATED ORAL 3 TIMES DAILY PRN
Qty: 10 TABLET | Refills: 0 | Status: SHIPPED | OUTPATIENT
Start: 2024-12-26

## 2024-12-26 RX ORDER — ONDANSETRON HYDROCHLORIDE 2 MG/ML
4 INJECTION, SOLUTION INTRAVENOUS
Status: COMPLETED | OUTPATIENT
Start: 2024-12-26 | End: 2024-12-26

## 2024-12-26 RX ORDER — LIDOCAINE HYDROCHLORIDE 20 MG/ML
15 SOLUTION OROPHARYNGEAL ONCE
Status: COMPLETED | OUTPATIENT
Start: 2024-12-26 | End: 2024-12-26

## 2024-12-26 RX ORDER — FAMOTIDINE 10 MG/ML
20 INJECTION INTRAVENOUS
Status: COMPLETED | OUTPATIENT
Start: 2024-12-26 | End: 2024-12-26

## 2024-12-26 RX ORDER — KETOROLAC TROMETHAMINE 30 MG/ML
30 INJECTION, SOLUTION INTRAMUSCULAR; INTRAVENOUS
Status: COMPLETED | OUTPATIENT
Start: 2024-12-26 | End: 2024-12-26

## 2024-12-26 RX ORDER — ONDANSETRON 4 MG/1
4 TABLET, ORALLY DISINTEGRATING ORAL EVERY 8 HOURS PRN
Qty: 10 TABLET | Refills: 0 | Status: SHIPPED | OUTPATIENT
Start: 2024-12-26

## 2024-12-26 RX ADMIN — KETOROLAC TROMETHAMINE 30 MG: 30 INJECTION, SOLUTION INTRAMUSCULAR; INTRAVENOUS at 06:12

## 2024-12-26 RX ADMIN — FAMOTIDINE 20 MG: 10 INJECTION, SOLUTION INTRAVENOUS at 07:12

## 2024-12-26 RX ADMIN — HYDROMORPHONE HYDROCHLORIDE 1 MG: 2 INJECTION INTRAMUSCULAR; INTRAVENOUS; SUBCUTANEOUS at 08:12

## 2024-12-26 RX ADMIN — ALUMINUM HYDROXIDE, MAGNESIUM HYDROXIDE, AND SIMETHICONE 30 ML: 1200; 120; 1200 SUSPENSION ORAL at 08:12

## 2024-12-26 RX ADMIN — LIDOCAINE HYDROCHLORIDE 15 ML: 20 SOLUTION ORAL at 08:12

## 2024-12-26 RX ADMIN — ONDANSETRON 4 MG: 2 INJECTION INTRAMUSCULAR; INTRAVENOUS at 06:12

## 2024-12-27 NOTE — ED PROVIDER NOTES
Encounter Date: 12/26/2024       History     Chief Complaint   Patient presents with    Abdominal Pain     Pt reports generalized abdominal pain since earlier today. The pt spoke with her PCP and was advised to come to the ER. Hx of gallstones.      29-year-old female presents to emergency department complaining of epigastric pain since earlier today.  States she recently was diagnosed with gallstones last week after similar episode of abdominal pain.  Additional symptoms include nausea without vomiting and patient denies fever/chills/chest pain/shortness of breath.    The history is provided by the patient. No  was used.     Review of patient's allergies indicates:   Allergen Reactions    Codeine Anaphylaxis     History reviewed. No pertinent past medical history.  History reviewed. No pertinent surgical history.  Family History   Problem Relation Name Age of Onset    No Known Problems Mother      No Known Problems Father       Social History     Tobacco Use    Smoking status: Never     Passive exposure: Never    Smokeless tobacco: Never   Substance Use Topics    Alcohol use: Never    Drug use: Never     Review of Systems   Constitutional:  Negative for chills and fever.   Respiratory:  Negative for shortness of breath.    Cardiovascular:  Negative for chest pain.   Gastrointestinal:  Positive for abdominal pain and nausea. Negative for abdominal distention, anal bleeding, blood in stool, constipation, diarrhea, rectal pain and vomiting.   All other systems reviewed and are negative.      Physical Exam     Initial Vitals [12/26/24 1804]   BP Pulse Resp Temp SpO2   132/70 86 18 97.9 °F (36.6 °C) 100 %      MAP       --         Physical Exam    Nursing note and vitals reviewed.  Constitutional: She is not diaphoretic. No distress.   HENT:   Head: Normocephalic and atraumatic.   Right Ear: External ear normal.   Left Ear: External ear normal.   Eyes: Conjunctivae and EOM are normal.   Neck:    Normal range of motion.  Cardiovascular:  Normal rate and regular rhythm.           Pulmonary/Chest: Breath sounds normal. No respiratory distress.   Abdominal: Abdomen is soft. Bowel sounds are normal. She exhibits no distension and no mass. There is abdominal tenderness (Epigastric tenderness to palpation without peritoneal signs) in the epigastric area. There is no rebound, no guarding, no tenderness at McBurney's point and negative Gallardo's sign.   Musculoskeletal:         General: Normal range of motion.      Cervical back: Normal range of motion.     Neurological: She is alert. She has normal strength.   Skin: Skin is warm and dry.   Psychiatric: She has a normal mood and affect.         ED Course   Procedures  Labs Reviewed   POCT CMP - Abnormal       Result Value    Albumin, POC 4.0      Alkaline Phosphatase, POC 93      ALT (SGPT), POC 22      AST (SGOT), POC 32      POC BUN 10      Calcium, POC 9.5      POC Chloride 101      POC Creatinine 0.6      POC Glucose 101      POC Potassium 3.6      POC Sodium 144      Bilirubin, POC 0.5      POC TCO2 23      Protein, POC 8.2 (*)    POCT CBC    Hematocrit        Hemoglobin        RBC        WBC        MCV        MCH, POC        MCHC        RDW-CV        Platelet Count, POC        MPV       POCT URINE PREGNANCY   POCT CMP   POCT LIVER PANEL   POCT LIVER PANEL    Albumin, POC 4.2      Alkaline Phosphatase, POC 77      ALT (SGPT), POC 24      Amylase, POC 60      AST (SGOT), POC 27      POC GGT 23      Bilirubin, POC 0.5      Protein, POC 8.1            Imaging Results              US Abdomen Limited (Final result)  Result time 12/26/24 19:36:33      Final result by Jayleen Delgadillo MD (12/26/24 19:36:33)                   Impression:      Cholelithiasis.  No ultrasonographic evidence to suggest acute cholecystitis.      Electronically signed by: Jayleen Delgadillo MD  Date:    12/26/2024  Time:    19:36               Narrative:    EXAMINATION:  US ABDOMEN  LIMITED    CLINICAL HISTORY:  Epigastric discomfort, patient reports history of gallstones;    TECHNIQUE:  Limited ultrasound of the right upper quadrant of the abdomen (including pancreas, liver, gallbladder, common bile duct, and spleen) was performed.    COMPARISON:  None.    FINDINGS:  Visualized hepatic parenchyma is homogeneous without evidence for masses.  No intra- or extrahepatic biliary ductal dilatation. The common bile duct measures 0.4 cm.  There is cholelithiasis.  No evidence of gallbladder wall thickening or pericholecystic fluid.  Sonographic Gallardo's sign is negative. The visualized portion of the pancreas appears normal.  No evidence of right-sided hydronephrosis.  No ascites.                                       Medications   ondansetron injection 4 mg (4 mg Intravenous Given 12/26/24 1845)   ketorolac injection 30 mg (30 mg Intravenous Given 12/26/24 1845)   aluminum-magnesium hydroxide-simethicone 200-200-20 mg/5 mL suspension 30 mL (30 mLs Oral Given 12/26/24 2002)     And   LIDOcaine viscous HCl 2% oral solution 15 mL (15 mLs Oral Given 12/26/24 2001)   famotidine (PF) injection 20 mg (20 mg Intravenous Given 12/26/24 1958)   HYDROmorphone (PF) injection 1 mg (1 mg Intravenous Given 12/26/24 2032)     Medical Decision Making  29-year-old female presents to emergency department complaining of epigastric pain since earlier today.  States she recently was diagnosed with gallstones last week after similar episode of abdominal pain.  Additional symptoms include nausea without vomiting and patient denies fever/chills/chest pain/shortness of breath.  Course of ED stay:   CBC was reassuring except for white blood cell count 20001.  CMP was reassuring.  Amylase was normal.  Bilirubin was normal.  Ultrasound of abdomen showed no acute disease, but was remarkable for cholelithiasis without cholecystitis.  Patient received Zofran/Toradol/GI cocktail/Pepcid/Dilaudid in the ED and states pain has improved  significantly prior to discharge.  She was able to tolerate p.o. fluids and solids brought in to discharge.  Referral to general surgery clinic was given as well as instructions for cholelithiasis.  Patient was advised to follow-up with general surgery within the next week for re-evaluation/return to the emergency department if condition worsens.    Amount and/or Complexity of Data Reviewed  Labs: ordered.  Radiology: ordered.    Risk  OTC drugs.  Prescription drug management.                                      Clinical Impression:  Final diagnoses:  [K80.20] Calculus of gallbladder without cholecystitis without obstruction (Primary)          ED Disposition Condition    Discharge Stable          ED Prescriptions       Medication Sig Dispense Start Date End Date Auth. Provider    ketorolac (TORADOL) 10 mg tablet Take 1 tablet (10 mg total) by mouth 3 (three) times daily as needed for Pain. 10 tablet 12/26/2024 -- Luis Eduardo Trujillo MD    ondansetron (ZOFRAN-ODT) 4 MG TbDL Take 1 tablet (4 mg total) by mouth every 8 (eight) hours as needed (Nausea). 10 tablet 12/26/2024 -- Luis Eduardo Trujillo MD          Follow-up Information    None          Luis Eduardo Trujillo MD  12/26/24 6030

## 2024-12-27 NOTE — DISCHARGE INSTRUCTIONS
Follow-up with general surgery clinic as scheduled and with your primary care physician within the next week for re-evaluation.

## 2024-12-27 NOTE — ED NOTES
Velvet Kramer, a 29 y.o. female presents to the ED w/ complaint of abdominal pain since this morning with Hx of gallstones.       Chief Complaint   Patient presents with    Abdominal Pain     Pt reports generalized abdominal pain since earlier today. The pt spoke with her PCP and was advised to come to the ER. Hx of gallstones.      Review of patient's allergies indicates:   Allergen Reactions    Codeine Anaphylaxis     No past medical history on file.

## 2025-06-24 ENCOUNTER — TELEPHONE (OUTPATIENT)
Dept: OBSTETRICS AND GYNECOLOGY | Facility: CLINIC | Age: 30
End: 2025-06-24
Payer: COMMERCIAL

## 2025-06-24 NOTE — TELEPHONE ENCOUNTER
Provider will be in sx on 6/25 pm. Contacted pt to advise.  No answer. Left vm and portal message sent

## 2025-07-15 ENCOUNTER — TELEPHONE (OUTPATIENT)
Dept: OBSTETRICS AND GYNECOLOGY | Facility: CLINIC | Age: 30
End: 2025-07-15
Payer: COMMERCIAL

## 2025-07-15 NOTE — TELEPHONE ENCOUNTER
No answer on call back. Left vm.       Copied from CRM #[id number]. Topic: Escalation - Escalation To Clinic  >> Jul 14, 2025  9:53 AM Ree wrote:  Type: Patient Call Back    Who called:pt     What is the request in detail:pt has an appt for 7/24/25 but is requesting to be seen sooner. Requesting to be seen on Friday, 18/2025. She is off on that day. Nothing available. Call pt     Can the clinic reply by [facility]?    Would the patient rather a call back or a response via [facility]? call    Best call back number:[phone] (home)      Additional Information:

## 2025-07-21 ENCOUNTER — OFFICE VISIT (OUTPATIENT)
Dept: URGENT CARE | Facility: CLINIC | Age: 30
End: 2025-07-21
Payer: COMMERCIAL

## 2025-07-21 VITALS
DIASTOLIC BLOOD PRESSURE: 82 MMHG | HEART RATE: 77 BPM | HEIGHT: 60 IN | TEMPERATURE: 98 F | WEIGHT: 179.88 LBS | RESPIRATION RATE: 18 BRPM | SYSTOLIC BLOOD PRESSURE: 120 MMHG | OXYGEN SATURATION: 97 % | BODY MASS INDEX: 35.31 KG/M2

## 2025-07-21 DIAGNOSIS — H10.33 ACUTE BACTERIAL CONJUNCTIVITIS OF BOTH EYES: ICD-10-CM

## 2025-07-21 DIAGNOSIS — R50.9 FEVER IN ADULT: Primary | ICD-10-CM

## 2025-07-21 LAB
CTP QC/QA: YES
CTP QC/QA: YES
MOLECULAR STREP A: NEGATIVE
SARS-COV+SARS-COV-2 AG RESP QL IA.RAPID: NEGATIVE

## 2025-07-21 PROCEDURE — 87811 SARS-COV-2 COVID19 W/OPTIC: CPT | Mod: QW,S$GLB,, | Performed by: NURSE PRACTITIONER

## 2025-07-21 PROCEDURE — 99214 OFFICE O/P EST MOD 30 MIN: CPT | Mod: S$GLB,,, | Performed by: NURSE PRACTITIONER

## 2025-07-21 PROCEDURE — 87651 STREP A DNA AMP PROBE: CPT | Mod: QW,S$GLB,, | Performed by: NURSE PRACTITIONER

## 2025-07-21 RX ORDER — POLYMYXIN B SULFATE AND TRIMETHOPRIM 1; 10000 MG/ML; [USP'U]/ML
1 SOLUTION OPHTHALMIC EVERY 6 HOURS
Qty: 10 ML | Refills: 0 | Status: SHIPPED | OUTPATIENT
Start: 2025-07-21 | End: 2025-07-28

## 2025-07-21 NOTE — LETTER
July 21, 2025      [facility] [place]  [address] [facility] [address]  Phone: [phone]  Fax: [phone]       Patient: Melanie Otto   YOB: 1995  Date of Visit: 07/21/2025    To Whom It May Concern:    Hemal Otto  was at [facility] on 07/21/2025. The patient may return to work/school on 7/22/2025 with no restrictions. Please excuse from work 7/21/2025.  If you have any questions or concerns, or if I can be of further assistance, please do not hesitate to contact me.    Sincerely,    TAMMY OchoaC

## 2025-07-21 NOTE — PROGRESS NOTES
Subjective:      Patient ID: Melanie Otto is a 30 y.o. female.    Vitals:  height is 5' (1.524 m) and weight is 81.6 kg (179 lb 14.3 oz). Her oral temperature is 98.1 °F (36.7 °C). Her blood pressure is 120/82 and her pulse is 77. Her respiration is 18 and oxygen saturation is 97%.     Chief Complaint: Eye Problem    This is a 30 y.o. female who presents today with a chief complaint of redness and itching in both eyes.  Pt also reports myalgia and fever 104 that started yesterday Sunday morning. Pt states she only put water in eyes on yesterday.  Pt also reports that she is having a slight bleeding with rhinorrhea.    Eye Problem   Both eyes are affected. This is a new problem. The current episode started yesterday. The problem occurs constantly. The problem has been gradually worsening. The injury mechanism is unknown. The pain is at a severity of 0/10. The patient is experiencing no pain. There is No known exposure to pink eye. She Does not wear contacts. Associated symptoms include eye redness, a fever and itching (and dry). Pertinent negatives include no blurred vision, eye discharge, double vision, nausea, photophobia or vomiting. She has tried water for the symptoms. The treatment provided no relief.       Constitution: Positive for fever. Negative for chills and fatigue.   HENT:  Positive for nosebleeds. Negative for ear pain, congestion, sinus pain and sore throat.    Cardiovascular:  Negative for chest pain.   Eyes:  Positive for eye itching (and dry) and eye redness. Negative for eye trauma, foreign body in eye, eye discharge, photophobia, vision loss, double vision, blurred vision and eyelid swelling.   Respiratory:  Negative for cough, sputum production and shortness of breath.    Gastrointestinal:  Negative for nausea, vomiting and diarrhea.   Musculoskeletal:  Positive for muscle ache.   Neurological:  Negative for headaches.      Objective:     Physical Exam   Constitutional: She is  oriented to person, place, and time.   HENT:   Head: Normocephalic.   Ears:   Right Ear: Hearing and external ear normal. No no drainage, swelling or tenderness. Tympanic membrane is not erythematous, not retracted and not bulging. No middle ear effusion.   Left Ear: Hearing and external ear normal. No no drainage, swelling or tenderness. Tympanic membrane is not erythematous, not retracted and not bulging.  No middle ear effusion.   Nose: Mucosal edema (very erythematous and edematous bilaterally) present. No rhinorrhea or purulent discharge. Epistaxis (scant) is observed. Right sinus exhibits no maxillary sinus tenderness and no frontal sinus tenderness. Left sinus exhibits no maxillary sinus tenderness and no frontal sinus tenderness.   Mouth/Throat: Uvula is midline and mucous membranes are normal. No trismus in the jaw. No uvula swelling. Posterior oropharyngeal erythema present. No oropharyngeal exudate or posterior oropharyngeal edema.   Eyes: Pupils are equal, round, and reactive to light. No visual field deficit is present. Right eye exhibits no discharge. Left eye exhibits no discharge. Right conjunctiva is injected. Right conjunctiva has no hemorrhage. Left conjunctiva is injected. Left conjunctiva has no hemorrhage. No scleral icterus. Right eye exhibits normal extraocular motion and no nystagmus. Left eye exhibits normal extraocular motion and no nystagmus. Right pupil is round, reactive and not sluggish. Left pupil is round, reactive and not sluggish. Pupils are equal. Extraocular movement intact vision grossly intact gaze aligned appropriately periorbital hyperpigmentation  Cardiovascular: Normal rate and regular rhythm.   Pulmonary/Chest: Effort normal and breath sounds normal.   Neurological: She is alert and oriented to person, place, and time.   Skin: Skin is warm and dry.   Nursing note and vitals reviewed.      Assessment:     1. Fever in adult    2. Acute bacterial conjunctivitis of both eyes   "      Plan:       Fever in adult  -     SARS Coronavirus 2 Antigen, POCT Manual Read  -     POCT Strep A, Molecular    Acute bacterial conjunctivitis of both eyes  -     polymyxin B sulf-trimethoprim (POLYTRIM) 10,000 unit- 1 mg/mL Drop; Place 1 drop into both eyes every 6 (six) hours. for 7 days  Dispense: 10 mL; Refill: 0      Patient Instructions   Fever in adult  -     SARS Coronavirus 2 Antigen, POCT Manual Read  -     POCT Strep A, Molecular    Acute bacterial conjunctivitis of both eyes    -     polymyxin B sulf-trimethoprim (POLYTRIM) 10,000 unit- 1 mg/mL Drop; Place 1 drop into both eyes every 6 (six) hours. for 7 days  Dispense: 10 mL; Refill: 0      Viral URI (upper respiratory infection):    Your symptoms are viral in nature.  Viral upper respiratory infections typically run their course in 7-10 days.     - Rest at home.     - Drink plenty of fluids so you won't get dehydrated.      Avoid taking Decongestants such as pseudoephedrine (ex. Sudafed) or phenylephrine (ex. Mucinex FastMax, Dayquil, Nyquil, or any combo cold meds that say "cold," "sinus" or "-D").        - Cough recommendations:   Warm tea with honey can help with cough. Honey is a natural cough suppressant.  - Dextromethorphan (DM) is a cough suppressant over the counter (ie. mucinex DM OR delsym).        - Congestion recommendations:      - Mucinex (guaifenesin) twice a day (or as directed) to help loosen mucous.       - Fever/Pain recommendations:  Alternate Tylenol or Ibuprofen as directed for fever/pain.   - Motrin/Ibuprofen every 6-8 hours for pain and inflammation. Do not take ibuprofen if you have a history of GI bleeding, kidney disease, or if you take blood thinners.    - Tylenol/acetaminophen every 6-8 hours for added pain relief.  Avoid tylenol if you have a history of liver disease.       -Sore throat recommendations: Warm fluids, warm salt water gargles, throat lozenges, tea, honey, soup, or drinking something cold or " frozen.  Throat lozenges or sprays help reduce pain. Gargling with warm saltwater (1/4 teaspoon of salt in 1/2 cup of warm water) or an OTC anesthetic gargle may be useful for irritation.    When to seek medical advice  Call your healthcare provider right away if any of these occur:  Fever that is poorly controlled with OTC fever reducing medication  New or worsening ear pain, sinus pain, or headache  Stiff neck  You can't swallow liquids or you can't open your mouth wide because of throat pain  Signs of dehydration. These include very dark urine or no urine, sunken eyes, and dizziness.  Trouble breathing or noisy breathing  Muffled voice  Rash     If your symptoms worsen or fail to improve you should go to Emergency Department.

## 2025-07-21 NOTE — PATIENT INSTRUCTIONS
"Fever in adult  -     SARS Coronavirus 2 Antigen, POCT Manual Read  -     POCT Strep A, Molecular    Acute bacterial conjunctivitis of both eyes    -     polymyxin B sulf-trimethoprim (POLYTRIM) 10,000 unit- 1 mg/mL Drop; Place 1 drop into both eyes every 6 (six) hours. for 7 days  Dispense: 10 mL; Refill: 0      Viral URI (upper respiratory infection):    Your symptoms are viral in nature.  Viral upper respiratory infections typically run their course in 7-10 days.     - Rest at home.     - Drink plenty of fluids so you won't get dehydrated.      Avoid taking Decongestants such as pseudoephedrine (ex. Sudafed) or phenylephrine (ex. Mucinex FastMax, Dayquil, Nyquil, or any combo cold meds that say "cold," "sinus" or "-D").        - Cough recommendations:   Warm tea with honey can help with cough. Honey is a natural cough suppressant.  - Dextromethorphan (DM) is a cough suppressant over the counter (ie. mucinex DM OR delsym).        - Congestion recommendations:      - Mucinex (guaifenesin) twice a day (or as directed) to help loosen mucous.       - Fever/Pain recommendations:  Alternate Tylenol or Ibuprofen as directed for fever/pain.   - Motrin/Ibuprofen every 6-8 hours for pain and inflammation. Do not take ibuprofen if you have a history of GI bleeding, kidney disease, or if you take blood thinners.    - Tylenol/acetaminophen every 6-8 hours for added pain relief.  Avoid tylenol if you have a history of liver disease.       -Sore throat recommendations: Warm fluids, warm salt water gargles, throat lozenges, tea, honey, soup, or drinking something cold or frozen.  Throat lozenges or sprays help reduce pain. Gargling with warm saltwater (1/4 teaspoon of salt in 1/2 cup of warm water) or an OTC anesthetic gargle may be useful for irritation.    When to seek medical advice  Call your healthcare provider right away if any of these occur:  Fever that is poorly controlled with OTC fever reducing medication  New or " worsening ear pain, sinus pain, or headache  Stiff neck  You can't swallow liquids or you can't open your mouth wide because of throat pain  Signs of dehydration. These include very dark urine or no urine, sunken eyes, and dizziness.  Trouble breathing or noisy breathing  Muffled voice  Rash     If your symptoms worsen or fail to improve you should go to Emergency Department.

## 2025-08-22 ENCOUNTER — OFFICE VISIT (OUTPATIENT)
Dept: OBSTETRICS AND GYNECOLOGY | Facility: CLINIC | Age: 30
End: 2025-08-22
Payer: COMMERCIAL

## 2025-08-22 VITALS
SYSTOLIC BLOOD PRESSURE: 110 MMHG | WEIGHT: 180.75 LBS | DIASTOLIC BLOOD PRESSURE: 60 MMHG | BODY MASS INDEX: 35.31 KG/M2

## 2025-08-22 DIAGNOSIS — Z12.4 ENCOUNTER FOR PAPANICOLAOU SMEAR FOR CERVICAL CANCER SCREENING: ICD-10-CM

## 2025-08-22 DIAGNOSIS — Z01.419 WOMEN'S ANNUAL ROUTINE GYNECOLOGICAL EXAMINATION: Primary | ICD-10-CM

## 2025-08-22 DIAGNOSIS — Z30.41 ENCOUNTER FOR SURVEILLANCE OF CONTRACEPTIVE PILLS: ICD-10-CM

## 2025-08-22 DIAGNOSIS — Z11.3 ENCOUNTER FOR SCREENING EXAMINATION FOR SEXUALLY TRANSMITTED DISEASE: ICD-10-CM

## 2025-08-22 DIAGNOSIS — Z11.51 SCREENING FOR HUMAN PAPILLOMAVIRUS (HPV): ICD-10-CM

## 2025-08-22 DIAGNOSIS — Z12.39 SCREENING BREAST EXAMINATION: ICD-10-CM

## 2025-08-22 DIAGNOSIS — N64.52 NIPPLE DISCHARGE: ICD-10-CM

## 2025-08-22 PROCEDURE — 99999 PR PBB SHADOW E&M-EST. PATIENT-LVL II: CPT | Mod: PBBFAC,,, | Performed by: PHYSICIAN ASSISTANT
